# Patient Record
Sex: FEMALE | Race: WHITE | NOT HISPANIC OR LATINO
[De-identification: names, ages, dates, MRNs, and addresses within clinical notes are randomized per-mention and may not be internally consistent; named-entity substitution may affect disease eponyms.]

---

## 2018-10-10 ENCOUNTER — APPOINTMENT (OUTPATIENT)
Dept: BARIATRICS | Facility: CLINIC | Age: 33
End: 2018-10-10
Payer: COMMERCIAL

## 2018-10-10 VITALS
OXYGEN SATURATION: 100 % | HEIGHT: 64 IN | TEMPERATURE: 97.9 F | BODY MASS INDEX: 40.29 KG/M2 | DIASTOLIC BLOOD PRESSURE: 77 MMHG | SYSTOLIC BLOOD PRESSURE: 127 MMHG | HEART RATE: 78 BPM | WEIGHT: 236 LBS

## 2018-10-10 DIAGNOSIS — N39.3 STRESS INCONTINENCE (FEMALE) (MALE): ICD-10-CM

## 2018-10-10 DIAGNOSIS — K21.9 GASTRO-ESOPHAGEAL REFLUX DISEASE W/OUT ESOPHAGITIS: ICD-10-CM

## 2018-10-10 PROCEDURE — 99203 OFFICE O/P NEW LOW 30 MIN: CPT

## 2018-10-10 PROCEDURE — 97802 MEDICAL NUTRITION INDIV IN: CPT

## 2018-11-14 ENCOUNTER — APPOINTMENT (OUTPATIENT)
Dept: BARIATRICS | Facility: CLINIC | Age: 33
End: 2018-11-14
Payer: COMMERCIAL

## 2018-11-14 ENCOUNTER — TRANSCRIPTION ENCOUNTER (OUTPATIENT)
Age: 33
End: 2018-11-14

## 2018-11-14 VITALS
BODY MASS INDEX: 40.8 KG/M2 | HEIGHT: 64 IN | WEIGHT: 239 LBS | DIASTOLIC BLOOD PRESSURE: 73 MMHG | OXYGEN SATURATION: 98 % | TEMPERATURE: 98.1 F | SYSTOLIC BLOOD PRESSURE: 111 MMHG | HEART RATE: 73 BPM

## 2018-11-14 DIAGNOSIS — R06.02 SHORTNESS OF BREATH: ICD-10-CM

## 2018-11-14 DIAGNOSIS — Z83.49 FAMILY HISTORY OF OTHER ENDOCRINE, NUTRITIONAL AND METABOLIC DISEASES: ICD-10-CM

## 2018-11-14 DIAGNOSIS — Z86.39 PERSONAL HISTORY OF OTHER ENDOCRINE, NUTRITIONAL AND METABOLIC DISEASE: ICD-10-CM

## 2018-11-14 DIAGNOSIS — R12 HEARTBURN: ICD-10-CM

## 2018-11-14 DIAGNOSIS — E66.01 MORBID (SEVERE) OBESITY DUE TO EXCESS CALORIES: ICD-10-CM

## 2018-11-14 DIAGNOSIS — R32 UNSPECIFIED URINARY INCONTINENCE: ICD-10-CM

## 2018-11-14 DIAGNOSIS — M25.579 PAIN IN UNSPECIFIED ANKLE AND JOINTS OF UNSPECIFIED FOOT: ICD-10-CM

## 2018-11-14 DIAGNOSIS — Z85.850 PERSONAL HISTORY OF MALIGNANT NEOPLASM OF THYROID: ICD-10-CM

## 2018-11-14 DIAGNOSIS — Z83.3 FAMILY HISTORY OF DIABETES MELLITUS: ICD-10-CM

## 2018-11-14 DIAGNOSIS — Z78.9 OTHER SPECIFIED HEALTH STATUS: ICD-10-CM

## 2018-11-14 PROCEDURE — 99213 OFFICE O/P EST LOW 20 MIN: CPT

## 2018-11-14 RX ORDER — LEVOTHYROXINE SODIUM 137 UG/1
137 TABLET ORAL
Refills: 0 | Status: ACTIVE | COMMUNITY

## 2018-11-26 VITALS
WEIGHT: 233.47 LBS | HEART RATE: 84 BPM | RESPIRATION RATE: 18 BRPM | OXYGEN SATURATION: 98 % | TEMPERATURE: 98 F | DIASTOLIC BLOOD PRESSURE: 76 MMHG | SYSTOLIC BLOOD PRESSURE: 113 MMHG | HEIGHT: 64 IN

## 2018-11-27 ENCOUNTER — RESULT REVIEW (OUTPATIENT)
Age: 33
End: 2018-11-27

## 2018-11-27 ENCOUNTER — INPATIENT (INPATIENT)
Facility: HOSPITAL | Age: 33
LOS: 2 days | Discharge: HOME | DRG: 621 | End: 2018-11-30
Attending: SURGERY | Admitting: SURGERY
Payer: COMMERCIAL

## 2018-11-27 ENCOUNTER — APPOINTMENT (OUTPATIENT)
Dept: BARIATRICS | Facility: HOSPITAL | Age: 33
End: 2018-11-27

## 2018-11-27 DIAGNOSIS — Z98.890 OTHER SPECIFIED POSTPROCEDURAL STATES: Chronic | ICD-10-CM

## 2018-11-27 DIAGNOSIS — E89.0 POSTPROCEDURAL HYPOTHYROIDISM: Chronic | ICD-10-CM

## 2018-11-27 LAB
APPEARANCE UR: ABNORMAL
BACTERIA # UR AUTO: PRESENT /HPF
BILIRUB UR-MCNC: NEGATIVE — SIGNIFICANT CHANGE UP
COLOR SPEC: YELLOW — SIGNIFICANT CHANGE UP
DIFF PNL FLD: NEGATIVE — SIGNIFICANT CHANGE UP
EPI CELLS # UR: ABNORMAL /HPF (ref 0–5)
GLUCOSE UR QL: NEGATIVE — SIGNIFICANT CHANGE UP
HCT VFR BLD CALC: 36.3 % — SIGNIFICANT CHANGE UP (ref 34.5–45)
HGB BLD-MCNC: 12.3 G/DL — SIGNIFICANT CHANGE UP (ref 11.5–15.5)
KETONES UR-MCNC: NEGATIVE — SIGNIFICANT CHANGE UP
LEUKOCYTE ESTERASE UR-ACNC: ABNORMAL
MCHC RBC-ENTMCNC: 29.6 PG — SIGNIFICANT CHANGE UP (ref 27–34)
MCHC RBC-ENTMCNC: 33.9 G/DL — SIGNIFICANT CHANGE UP (ref 32–36)
MCV RBC AUTO: 87.5 FL — SIGNIFICANT CHANGE UP (ref 80–100)
NITRITE UR-MCNC: NEGATIVE — SIGNIFICANT CHANGE UP
PH UR: 6 — SIGNIFICANT CHANGE UP (ref 5–8)
PLATELET # BLD AUTO: 262 K/UL — SIGNIFICANT CHANGE UP (ref 150–400)
PROT UR-MCNC: NEGATIVE MG/DL — SIGNIFICANT CHANGE UP
RBC # BLD: 4.15 M/UL — SIGNIFICANT CHANGE UP (ref 3.8–5.2)
RBC # FLD: 12.8 % — SIGNIFICANT CHANGE UP (ref 10.3–16.9)
RBC CASTS # UR COMP ASSIST: < 5 /HPF — SIGNIFICANT CHANGE UP
SP GR SPEC: 1.02 — SIGNIFICANT CHANGE UP (ref 1–1.03)
UROBILINOGEN FLD QL: 0.2 E.U./DL — SIGNIFICANT CHANGE UP
WBC # BLD: 18 K/UL — HIGH (ref 3.8–10.5)
WBC # FLD AUTO: 18 K/UL — HIGH (ref 3.8–10.5)
WBC UR QL: > 10 /HPF

## 2018-11-27 PROCEDURE — 43775 LAP SLEEVE GASTRECTOMY: CPT | Mod: GC

## 2018-11-27 RX ORDER — ONDANSETRON 8 MG/1
4 TABLET, FILM COATED ORAL EVERY 6 HOURS
Qty: 0 | Refills: 0 | Status: DISCONTINUED | OUTPATIENT
Start: 2018-11-27 | End: 2018-11-30

## 2018-11-27 RX ORDER — HYDROMORPHONE HYDROCHLORIDE 2 MG/ML
0.5 INJECTION INTRAMUSCULAR; INTRAVENOUS; SUBCUTANEOUS
Qty: 0 | Refills: 0 | Status: DISCONTINUED | OUTPATIENT
Start: 2018-11-27 | End: 2018-11-28

## 2018-11-27 RX ORDER — HYDROMORPHONE HYDROCHLORIDE 2 MG/ML
1 INJECTION INTRAMUSCULAR; INTRAVENOUS; SUBCUTANEOUS EVERY 4 HOURS
Qty: 0 | Refills: 0 | Status: DISCONTINUED | OUTPATIENT
Start: 2018-11-27 | End: 2018-11-28

## 2018-11-27 RX ORDER — SODIUM CHLORIDE 9 MG/ML
1000 INJECTION, SOLUTION INTRAVENOUS
Qty: 0 | Refills: 0 | Status: DISCONTINUED | OUTPATIENT
Start: 2018-11-27 | End: 2018-11-28

## 2018-11-27 RX ORDER — ENOXAPARIN SODIUM 100 MG/ML
40 INJECTION SUBCUTANEOUS ONCE
Qty: 0 | Refills: 0 | Status: COMPLETED | OUTPATIENT
Start: 2018-11-27 | End: 2018-11-27

## 2018-11-27 RX ORDER — LEVOTHYROXINE SODIUM 125 MCG
137 TABLET ORAL DAILY
Qty: 0 | Refills: 0 | Status: DISCONTINUED | OUTPATIENT
Start: 2018-11-27 | End: 2018-11-30

## 2018-11-27 RX ORDER — HEPARIN SODIUM 5000 [USP'U]/ML
7500 INJECTION INTRAVENOUS; SUBCUTANEOUS EVERY 8 HOURS
Qty: 0 | Refills: 0 | Status: DISCONTINUED | OUTPATIENT
Start: 2018-11-27 | End: 2018-11-30

## 2018-11-27 RX ORDER — HYDROMORPHONE HYDROCHLORIDE 2 MG/ML
0.5 INJECTION INTRAMUSCULAR; INTRAVENOUS; SUBCUTANEOUS EVERY 4 HOURS
Qty: 0 | Refills: 0 | Status: DISCONTINUED | OUTPATIENT
Start: 2018-11-27 | End: 2018-11-28

## 2018-11-27 RX ORDER — PANTOPRAZOLE SODIUM 20 MG/1
40 TABLET, DELAYED RELEASE ORAL DAILY
Qty: 0 | Refills: 0 | Status: DISCONTINUED | OUTPATIENT
Start: 2018-11-27 | End: 2018-11-30

## 2018-11-27 RX ORDER — BUPIVACAINE 13.3 MG/ML
20 INJECTION, SUSPENSION, LIPOSOMAL INFILTRATION ONCE
Qty: 0 | Refills: 0 | Status: DISCONTINUED | OUTPATIENT
Start: 2018-11-27 | End: 2018-11-30

## 2018-11-27 RX ORDER — SCOPALAMINE 1 MG/3D
1 PATCH, EXTENDED RELEASE TRANSDERMAL ONCE
Qty: 0 | Refills: 0 | Status: COMPLETED | OUTPATIENT
Start: 2018-11-27 | End: 2018-11-27

## 2018-11-27 RX ADMIN — HYDROMORPHONE HYDROCHLORIDE 1 MILLIGRAM(S): 2 INJECTION INTRAMUSCULAR; INTRAVENOUS; SUBCUTANEOUS at 22:09

## 2018-11-27 RX ADMIN — HYDROMORPHONE HYDROCHLORIDE 1 MILLIGRAM(S): 2 INJECTION INTRAMUSCULAR; INTRAVENOUS; SUBCUTANEOUS at 22:20

## 2018-11-27 RX ADMIN — SCOPALAMINE 1 PATCH: 1 PATCH, EXTENDED RELEASE TRANSDERMAL at 19:05

## 2018-11-27 RX ADMIN — ENOXAPARIN SODIUM 40 MILLIGRAM(S): 100 INJECTION SUBCUTANEOUS at 11:11

## 2018-11-27 RX ADMIN — ONDANSETRON 4 MILLIGRAM(S): 8 TABLET, FILM COATED ORAL at 22:04

## 2018-11-27 RX ADMIN — SCOPALAMINE 1 PATCH: 1 PATCH, EXTENDED RELEASE TRANSDERMAL at 11:11

## 2018-11-27 RX ADMIN — HEPARIN SODIUM 7500 UNIT(S): 5000 INJECTION INTRAVENOUS; SUBCUTANEOUS at 21:48

## 2018-11-27 RX ADMIN — HYDROMORPHONE HYDROCHLORIDE 0.5 MILLIGRAM(S): 2 INJECTION INTRAMUSCULAR; INTRAVENOUS; SUBCUTANEOUS at 18:45

## 2018-11-27 NOTE — H&P ADULT - NSHPPHYSICALEXAM_GEN_ALL_CORE
NEURO: A&Ox3, NAD, SORTO.  CV: RRR, S1&S2.   PULM: CTAB, no increased WOB.  ABD: Obese, Soft, ND, NT.   EXTR: WWP. No peripheral edema.

## 2018-11-27 NOTE — BRIEF OPERATIVE NOTE - PROCEDURE
<<-----Click on this checkbox to enter Procedure Robot-assisted laparoscopic sleeve gastrectomy  11/27/2018    Active  BBORDEN

## 2018-11-27 NOTE — H&P ADULT - HISTORY OF PRESENT ILLNESS
33F w/ h/o thyroid ca s/p total thyroidectomy 2009, GERD, stress urinary incontinence, joint pain & MO (BMI 40) who presents for robotic LSG.

## 2018-11-27 NOTE — PROGRESS NOTE ADULT - SUBJECTIVE AND OBJECTIVE BOX
POST-OPERATIVE NOTE    Procedure: Robot-assisted laparoscopic sleeve gastrectomy     Diagnosis/Indication:    Surgeon: morbid obesity    S: Pt has no complaints. Denies CP, SOB, SAAVEDRA, calf tenderness. Pain controlled with medication. Denies nausea, vomiting.    O:  T(C): --  T(F): --  HR: 62 (11-27-18 @ 19:00) (60 - 64)  BP: 136/58 (11-27-18 @ 19:00) (132/60 - 139/66)  RR: 25 (11-27-18 @ 19:00) (6 - 25)  SpO2: 100% (11-27-18 @ 19:00) (100% - 100%)  Wt(kg): --    Gen: NAD, resting comfortably in bed  C/V: NSR  Pulm: Nonlabored breathing, no respiratory distress  Abd: soft, NT/ND, incision areas are clean, dry and intact  Extrem: WWP, no calf edema or tenderness, SCDs in place    A/P: 33y Female s/p above procedure  Diet: BCLD  IVF: LR @ 100  Pain/nausea control  SQH/SCDs/OOBA/IS  Dispo pending pain control, PO tolerance, clinical improvement POST-OPERATIVE NOTE    Procedure: Robot-assisted laparoscopic sleeve gastrectomy     Diagnosis/Indication: morbid obesity    Surgeon: Dr. Claire    S: Pt has no complaints. Denies CP, SOB, SAAVEDRA, calf tenderness. Pain controlled with medication. Denies nausea, vomiting.    O:  T(C): --  T(F): --  HR: 62 (11-27-18 @ 19:00) (60 - 64)  BP: 136/58 (11-27-18 @ 19:00) (132/60 - 139/66)  RR: 25 (11-27-18 @ 19:00) (6 - 25)  SpO2: 100% (11-27-18 @ 19:00) (100% - 100%)  Wt(kg): --    Gen: NAD, resting comfortably in bed  C/V: NSR  Pulm: Nonlabored breathing, no respiratory distress  Abd: soft, NT/ND, incision areas are clean, dry and intact  Extrem: WWP, no calf edema or tenderness, SCDs in place    A/P: 33y Female s/p above procedure  Diet: BCLD  IVF: LR @ 100  Pain/nausea control  SQH/SCDs/OOBA/IS  Dispo pending pain control, PO tolerance, clinical improvement

## 2018-11-27 NOTE — BRIEF OPERATIVE NOTE - OPERATION/FINDINGS
Stomach divided along Fr Bougie edge using SureForm robotic stapler. Omentum sutured to greater curvature of sleeved stomach. Hemostasis achieved. Fascia at 12mm post site closed using Vicryl suture. Port sites closed w/ 4-0 Monocryl sutures & surgical glue.

## 2018-11-27 NOTE — H&P ADULT - ASSESSMENT
33F w/ h/o thyroid ca s/p total thyroidectomy 2009, GERD, stress urinary incontinence, joint pain & MO (BMI 40) who presents for robotic LSG.     -CBC 6hrs post-op  -Pain/nausea control  -BCLD; IVF until tolerating adequate PO  -Protonix  -SCDs, OOB/A, IS  -SQH pending post-op CBC  -AM labs    -Nutrition consult in AM

## 2018-11-28 ENCOUNTER — TRANSCRIPTION ENCOUNTER (OUTPATIENT)
Age: 33
End: 2018-11-28

## 2018-11-28 LAB
ANION GAP SERPL CALC-SCNC: 13 MMOL/L — SIGNIFICANT CHANGE UP (ref 5–17)
BUN SERPL-MCNC: 10 MG/DL — SIGNIFICANT CHANGE UP (ref 7–23)
CALCIUM SERPL-MCNC: 9 MG/DL — SIGNIFICANT CHANGE UP (ref 8.4–10.5)
CHLORIDE SERPL-SCNC: 99 MMOL/L — SIGNIFICANT CHANGE UP (ref 96–108)
CO2 SERPL-SCNC: 21 MMOL/L — LOW (ref 22–31)
CREAT SERPL-MCNC: 0.53 MG/DL — SIGNIFICANT CHANGE UP (ref 0.5–1.3)
GLUCOSE SERPL-MCNC: 142 MG/DL — HIGH (ref 70–99)
HCT VFR BLD CALC: 36.1 % — SIGNIFICANT CHANGE UP (ref 34.5–45)
HGB BLD-MCNC: 12.2 G/DL — SIGNIFICANT CHANGE UP (ref 11.5–15.5)
MAGNESIUM SERPL-MCNC: 1.9 MG/DL — SIGNIFICANT CHANGE UP (ref 1.6–2.6)
MCHC RBC-ENTMCNC: 29.6 PG — SIGNIFICANT CHANGE UP (ref 27–34)
MCHC RBC-ENTMCNC: 33.8 G/DL — SIGNIFICANT CHANGE UP (ref 32–36)
MCV RBC AUTO: 87.6 FL — SIGNIFICANT CHANGE UP (ref 80–100)
PHOSPHATE SERPL-MCNC: 3 MG/DL — SIGNIFICANT CHANGE UP (ref 2.5–4.5)
PLATELET # BLD AUTO: 281 K/UL — SIGNIFICANT CHANGE UP (ref 150–400)
POTASSIUM SERPL-MCNC: 3.8 MMOL/L — SIGNIFICANT CHANGE UP (ref 3.5–5.3)
POTASSIUM SERPL-SCNC: 3.8 MMOL/L — SIGNIFICANT CHANGE UP (ref 3.5–5.3)
RBC # BLD: 4.12 M/UL — SIGNIFICANT CHANGE UP (ref 3.8–5.2)
RBC # FLD: 12.7 % — SIGNIFICANT CHANGE UP (ref 10.3–16.9)
SODIUM SERPL-SCNC: 133 MMOL/L — LOW (ref 135–145)
WBC # BLD: 13.1 K/UL — HIGH (ref 3.8–10.5)
WBC # FLD AUTO: 13.1 K/UL — HIGH (ref 3.8–10.5)

## 2018-11-28 RX ORDER — METOCLOPRAMIDE HCL 10 MG
10 TABLET ORAL ONCE
Qty: 0 | Refills: 0 | Status: COMPLETED | OUTPATIENT
Start: 2018-11-28 | End: 2018-11-28

## 2018-11-28 RX ORDER — OXYCODONE AND ACETAMINOPHEN 5; 325 MG/1; MG/1
2 TABLET ORAL EVERY 4 HOURS
Qty: 0 | Refills: 0 | Status: DISCONTINUED | OUTPATIENT
Start: 2018-11-28 | End: 2018-11-30

## 2018-11-28 RX ORDER — DIPHENHYDRAMINE HCL 50 MG
25 CAPSULE ORAL ONCE
Qty: 0 | Refills: 0 | Status: COMPLETED | OUTPATIENT
Start: 2018-11-28 | End: 2018-11-28

## 2018-11-28 RX ORDER — DEXAMETHASONE 0.5 MG/5ML
6 ELIXIR ORAL ONCE
Qty: 0 | Refills: 0 | Status: COMPLETED | OUTPATIENT
Start: 2018-11-28 | End: 2018-11-28

## 2018-11-28 RX ORDER — SODIUM CHLORIDE 9 MG/ML
1000 INJECTION, SOLUTION INTRAVENOUS
Qty: 0 | Refills: 0 | Status: DISCONTINUED | OUTPATIENT
Start: 2018-11-28 | End: 2018-11-30

## 2018-11-28 RX ORDER — OXYCODONE AND ACETAMINOPHEN 5; 325 MG/1; MG/1
1 TABLET ORAL EVERY 4 HOURS
Qty: 0 | Refills: 0 | Status: DISCONTINUED | OUTPATIENT
Start: 2018-11-28 | End: 2018-11-30

## 2018-11-28 RX ADMIN — ONDANSETRON 4 MILLIGRAM(S): 8 TABLET, FILM COATED ORAL at 22:20

## 2018-11-28 RX ADMIN — Medication 10 MILLIGRAM(S): at 01:09

## 2018-11-28 RX ADMIN — HEPARIN SODIUM 7500 UNIT(S): 5000 INJECTION INTRAVENOUS; SUBCUTANEOUS at 05:44

## 2018-11-28 RX ADMIN — Medication 10 MILLIGRAM(S): at 09:56

## 2018-11-28 RX ADMIN — ONDANSETRON 4 MILLIGRAM(S): 8 TABLET, FILM COATED ORAL at 04:37

## 2018-11-28 RX ADMIN — HEPARIN SODIUM 7500 UNIT(S): 5000 INJECTION INTRAVENOUS; SUBCUTANEOUS at 13:25

## 2018-11-28 RX ADMIN — Medication 6 MILLIGRAM(S): at 15:53

## 2018-11-28 RX ADMIN — SCOPALAMINE 1 PATCH: 1 PATCH, EXTENDED RELEASE TRANSDERMAL at 20:07

## 2018-11-28 RX ADMIN — Medication 137 MICROGRAM(S): at 05:44

## 2018-11-28 RX ADMIN — HEPARIN SODIUM 7500 UNIT(S): 5000 INJECTION INTRAVENOUS; SUBCUTANEOUS at 22:21

## 2018-11-28 RX ADMIN — SODIUM CHLORIDE 100 MILLILITER(S): 9 INJECTION, SOLUTION INTRAVENOUS at 11:22

## 2018-11-28 RX ADMIN — Medication 25 MILLIGRAM(S): at 03:14

## 2018-11-28 RX ADMIN — PANTOPRAZOLE SODIUM 40 MILLIGRAM(S): 20 TABLET, DELAYED RELEASE ORAL at 13:15

## 2018-11-28 RX ADMIN — SODIUM CHLORIDE 100 MILLILITER(S): 9 INJECTION, SOLUTION INTRAVENOUS at 20:59

## 2018-11-28 RX ADMIN — SCOPALAMINE 1 PATCH: 1 PATCH, EXTENDED RELEASE TRANSDERMAL at 07:08

## 2018-11-28 RX ADMIN — SODIUM CHLORIDE 100 MILLILITER(S): 9 INJECTION, SOLUTION INTRAVENOUS at 04:37

## 2018-11-28 RX ADMIN — ONDANSETRON 4 MILLIGRAM(S): 8 TABLET, FILM COATED ORAL at 11:28

## 2018-11-28 NOTE — DISCHARGE NOTE ADULT - CARE PLAN
Principal Discharge DX:	Morbid obesity with BMI of 40.0-44.9, adult  Goal:	Recovery  Assessment and plan of treatment:	Follow up with  in 1 week. Call the office at  to schedule your appointment.  You may shower; soap and water over incision sites. Do not scrub. Pat dry when done. No tub bathing or swimming until cleared. Keep incision sites out of the sun as scars will darken. No heavy lifting (>10lbs) or strenuous exercise. Diet: Bariatric Full Fluids. 60 grams protein daily.  64 fluid ounces water daily. Drink small sips throughout the day. Continue diet as outlined by paperwork received as a pre-operative patient. You should be urinating at least 3-4x per day. Call the office if you experience increasing abdominal pain, nausea, vomiting, or temperature >100.4F.  NO ASPIRIN OR NSAIDs until approved by Dr. Claire. Avoid alcoholic beverages until cleared by Dr. Claire.

## 2018-11-28 NOTE — DIETITIAN INITIAL EVALUATION ADULT. - NUTRITIONGOAL OUTCOME1
Pt to express verbal comprehension of edu by stating 2 points (fluid and protein, vit/mineral needs)

## 2018-11-28 NOTE — DISCHARGE NOTE ADULT - ADDITIONAL INSTRUCTIONS
1) Please take Percocet 1 to 2 tabs by mouth every 4 to 6 hours as needed for pain control.  2) Please take Omeprazole 40 mg once a day by mouth.

## 2018-11-28 NOTE — PROGRESS NOTE ADULT - ASSESSMENT
33F w/ h/o thyroid ca s/p total thyroidectomy 2009, GERD, stress urinary incontinence, joint pain & MO (BMI 40) who presents for robotic LSG. Preop H/H 13.0/39.2.       -Pain/nausea control  -BCLD; IVF until tolerating adequate PO  -Protonix  -SCDs, OOB/A, IS  -AM labs    -Nutrition consult in AM

## 2018-11-28 NOTE — DIETITIAN INITIAL EVALUATION ADULT. - OTHER INFO
34yo F s/p RA lap sleeve gastrectomy POD1. Currently on BCLD diet and not tolerating well, pt reports experiencing nausea, spitting up and incisional pain. Pt reports she has protein supplements for when diet advanced that fit bariatric requirements (>20 g protein), and has vitamin D/Calcium supplements but unable to recall other vitamins she has. Discussed vitamins that will be needed and provided written list. Pt given edu on diet advancement and needs s/p LSG. Pt expressed comprehension of edu given. NKFA, Kosher diet. Skin: surgical incision; GI: abdominal pain and faint bowel sounds per flow sheet 34yo F s/p RA lap sleeve gastrectomy POD1. Currently on BARICLLQ diet tolating PO. MInimal intake 2/2 nausea, reported having some sips of water this am and nothing else from tray. Pt continuously spitting up during assessment. Pt reports having appropriate protein supplements and is prepared w/ most vitamins (chewable CalCitrate +D and MVI). RD provided indepth edu on diet advancement and specific nutrient needs s/p LSG. Pt expressed comprehension of edu given. NKFA or dietary restrictions. Skin: surgical incision; GI: abdominal pain and faint bowel sounds per flow sheet

## 2018-11-28 NOTE — DISCHARGE NOTE ADULT - CARE PROVIDER_API CALL
Dilip Claire (MD), Surgery  [address]  [address]  [place], [place] [address]  Phone: [phone]  Fax: [phone]

## 2018-11-28 NOTE — DIETITIAN INITIAL EVALUATION ADULT. - NS AS NUTRI INTERV ED CONTENT
Pt given education on bariatric diet progression, hydration, permitted foods and compliance with vitamin supplement regimen. Pt expressed comprehension of edu given, expected compliance is good when nausea subsides. Pt given education on bariatric diet progression, hydration, permitted foods and compliance with vitamin supplement regimen. Pt expressed comprehension of edu given, compliance expected.

## 2018-11-28 NOTE — DISCHARGE NOTE ADULT - PATIENT PORTAL LINK FT
You can access the [facility] Patient Portal, offered by [facility], by registering with the following website: [email or web address]

## 2018-11-28 NOTE — DISCHARGE NOTE ADULT - MEDICATION SUMMARY - MEDICATIONS TO TAKE
I will START or STAY ON the medications listed below when I get home from the hospital:    Percocet 5/325 oral tablet  -- 2 tab(s) by mouth every 6 hours x 2 days, As Needed  -for severe pain MDD:8   -- Caution federal law prohibits the transfer of this drug to any person other  than the person for whom it was prescribed.  May cause drowsiness.  Alcohol may intensify this effect.  Use care when operating dangerous machinery.  This prescription cannot be refilled.  This product contains acetaminophen.  Do not use  with any other product containing acetaminophen to prevent possible liver damage.  Using more of this medication than prescribed may cause serious breathing problems.    -- Indication: For pain    omeprazole 40 mg oral delayed release capsule  -- 1 cap(s) by mouth once a day   -- It is very important that you take or use this exactly as directed.  Do not skip doses or discontinue unless directed by your doctor.  Obtain medical advice before taking any non-prescription drugs as some may affect the action of this medication.  Swallow whole.  Do not crush.    -- Indication: For antacid    Synthroid 137 mcg (0.137 mg) oral tablet  -- 1 tab(s) by mouth once a day  -- Indication: For Home med    multivitamin  -- orally once a day  -- Indication: For supplement    Vitamin B12 500 mcg oral tablet  -- 1 tab(s) by mouth 2 times a day  -- Indication: For supplement    folic acid 0.4 mg oral tablet  -- orally 2 times a day  -- Indication: For supplement    Vitamin C 1000 mg oral tablet  -- 1 tab(s) by mouth once a day  -- Indication: For supplement    Vitamin D3 5000 intl units oral capsule  -- 1 cap(s) by mouth once a day  -- Indication: For supplement

## 2018-11-28 NOTE — PROGRESS NOTE ADULT - SUBJECTIVE AND OBJECTIVE BOX
OVERNIGHT EVENTS: POC wnl, pain controlled, c/o nausea (spit up) reglan given, post op h/h: 12.3/36.3, + TOV 400cc    STATUS POST:  Robotic assisted lap sleeve gastrectomy     POST OPERATIVE DAY #: 1    SUBJECTIVE: Patient examined bedside with chief resident. Patient complaining of incisional pain and nausea. Patient denies chest pain, SOB and vomiting.  Patient making adequate urine.        heparin  Injectable 7500 Unit(s) SubCutaneous every 8 hours      Vital Signs Last 24 Hrs  T(C): 37.3 (2018 09:20), Max: 37.3 (2018 09:20)  T(F): 99.2 (2018 09:20), Max: 99.2 (2018 09:20)  HR: 66 (2018 09:20) (60 - 75)  BP: 116/73 (2018 09:20) (107/71 - 139/66)  BP(mean): 90 (2018 19:00) (87 - 95)  RR: 18 (2018 09:20) (6 - 18)  SpO2: 95% (2018 09:20) (95% - 100%)  I&O's Detail    2018 07:01  -  2018 07:00  --------------------------------------------------------  IN:    lactated ringers.: 1200 mL  Total IN: 1200 mL    OUT:    Voided: 850 mL  Total OUT: 850 mL    Total NET: 350 mL      2018 07:01  -  2018 10:08  --------------------------------------------------------  IN:  Total IN: 0 mL    OUT:    Voided: 200 mL  Total OUT: 200 mL    Total NET: -200 mL          General: NAD, resting comfortably in bed  C/V: NSR  Pulm: Nonlabored breathing, no respiratory distress  Abd:  Soft, non- distended, TTP around incision site, incision clean dry and intact   Extrem: WWP, no edema, SCDs in place      LABS:                        12.2   13.1  )-----------( 281      ( 2018 06:00 )             36.1         133<L>  |  99  |  10  ----------------------------<  142<H>  3.8   |  21<L>  |  0.53    Ca    9.0      2018 05:58  Phos  3.0       Mg     1.9             Urinalysis Basic - ( 2018 11:10 )    Color: Yellow / Appearance: SL Cloudy / S.020 / pH: x  Gluc: x / Ketone: NEGATIVE  / Bili: Negative / Urobili: 0.2 E.U./dL   Blood: x / Protein: NEGATIVE mg/dL / Nitrite: NEGATIVE   Leuk Esterase: Moderate / RBC: < 5 /HPF / WBC > 10 /HPF   Sq Epi: x / Non Sq Epi: Moderate /HPF / Bacteria: Present /HPF        RADIOLOGY & ADDITIONAL STUDIES:

## 2018-11-28 NOTE — DISCHARGE NOTE ADULT - PLAN OF CARE
Recovery Follow up with  in 1 week. Call the office at  to schedule your appointment.  You may shower; soap and water over incision sites. Do not scrub. Pat dry when done. No tub bathing or swimming until cleared. Keep incision sites out of the sun as scars will darken. No heavy lifting (>10lbs) or strenuous exercise. Diet: Bariatric Full Fluids. 60 grams protein daily.  64 fluid ounces water daily. Drink small sips throughout the day. Continue diet as outlined by paperwork received as a pre-operative patient. You should be urinating at least 3-4x per day. Call the office if you experience increasing abdominal pain, nausea, vomiting, or temperature >100.4F.  NO ASPIRIN OR NSAIDs until approved by Dr. Claire. Avoid alcoholic beverages until cleared by Dr. Claire.

## 2018-11-28 NOTE — DISCHARGE NOTE ADULT - HOSPITAL COURSE
33F w/ h/o thyroid ca s/p total thyroidectomy 2009, GERD, stress urinary incontinence, joint pain & MO (BMI 40) who presents for robotic LSG. Patient s/p robotic assisted sleeve gastrectomy. Pt tolerated the procedure well. At time of discharge, pt was tolerating a bariatric clear liquid diet, and pt's pain was controlled. Plan is to follow up with   in the office.

## 2018-11-28 NOTE — DIETITIAN INITIAL EVALUATION ADULT. - ENERGY NEEDS
Height 5'4", Weight 233 lb,  lb +/-10%, %%, BMI 40.1  IBW used for calculations as pt >120% of IBW  Above energy needs calculated for wt maintenance (20-25 kcal/kg)  Weeks 1-2 estimated needs: 545-653kcal/day (10-12 kcal/kg),  81-114g pro/day (1.5-2.1 g/kg), >/=64 oz clear fluids Height 5'4", Weight 233 lb,  lb +/-10%, %%, BMI 40.1  IBW used for calculations as pt >120% of IBW  Above energy needs calculated for wt maintenance (20-25 kcal/kg)  Weeks 1-2 estimated needs: 544-653kcal/day (10-12kcal/kg), 82-114g pro/day (1.5-2.1g/kg), >/=64oz clear fluids.

## 2018-11-29 LAB
ANION GAP SERPL CALC-SCNC: 10 MMOL/L — SIGNIFICANT CHANGE UP (ref 5–17)
BUN SERPL-MCNC: 10 MG/DL — SIGNIFICANT CHANGE UP (ref 7–23)
CALCIUM SERPL-MCNC: 8.8 MG/DL — SIGNIFICANT CHANGE UP (ref 8.4–10.5)
CHLORIDE SERPL-SCNC: 102 MMOL/L — SIGNIFICANT CHANGE UP (ref 96–108)
CO2 SERPL-SCNC: 26 MMOL/L — SIGNIFICANT CHANGE UP (ref 22–31)
CREAT SERPL-MCNC: 0.61 MG/DL — SIGNIFICANT CHANGE UP (ref 0.5–1.3)
GLUCOSE SERPL-MCNC: 151 MG/DL — HIGH (ref 70–99)
HCT VFR BLD CALC: 35.1 % — SIGNIFICANT CHANGE UP (ref 34.5–45)
HGB BLD-MCNC: 11.6 G/DL — SIGNIFICANT CHANGE UP (ref 11.5–15.5)
MAGNESIUM SERPL-MCNC: 2.2 MG/DL — SIGNIFICANT CHANGE UP (ref 1.6–2.6)
MCHC RBC-ENTMCNC: 29.1 PG — SIGNIFICANT CHANGE UP (ref 27–34)
MCHC RBC-ENTMCNC: 33 G/DL — SIGNIFICANT CHANGE UP (ref 32–36)
MCV RBC AUTO: 88.2 FL — SIGNIFICANT CHANGE UP (ref 80–100)
PHOSPHATE SERPL-MCNC: 1.6 MG/DL — LOW (ref 2.5–4.5)
PLATELET # BLD AUTO: 292 K/UL — SIGNIFICANT CHANGE UP (ref 150–400)
POTASSIUM SERPL-MCNC: 3.7 MMOL/L — SIGNIFICANT CHANGE UP (ref 3.5–5.3)
POTASSIUM SERPL-SCNC: 3.7 MMOL/L — SIGNIFICANT CHANGE UP (ref 3.5–5.3)
RBC # BLD: 3.98 M/UL — SIGNIFICANT CHANGE UP (ref 3.8–5.2)
RBC # FLD: 13.1 % — SIGNIFICANT CHANGE UP (ref 10.3–16.9)
SODIUM SERPL-SCNC: 138 MMOL/L — SIGNIFICANT CHANGE UP (ref 135–145)
WBC # BLD: 15.8 K/UL — HIGH (ref 3.8–10.5)
WBC # FLD AUTO: 15.8 K/UL — HIGH (ref 3.8–10.5)

## 2018-11-29 RX ORDER — OMEPRAZOLE 10 MG/1
1 CAPSULE, DELAYED RELEASE ORAL
Qty: 30 | Refills: 0 | OUTPATIENT
Start: 2018-11-29 | End: 2018-12-28

## 2018-11-29 RX ORDER — HYDROMORPHONE HYDROCHLORIDE 2 MG/ML
0.5 INJECTION INTRAMUSCULAR; INTRAVENOUS; SUBCUTANEOUS ONCE
Qty: 0 | Refills: 0 | Status: DISCONTINUED | OUTPATIENT
Start: 2018-11-29 | End: 2018-11-29

## 2018-11-29 RX ORDER — POTASSIUM PHOSPHATE, MONOBASIC POTASSIUM PHOSPHATE, DIBASIC 236; 224 MG/ML; MG/ML
30 INJECTION, SOLUTION INTRAVENOUS ONCE
Qty: 0 | Refills: 0 | Status: COMPLETED | OUTPATIENT
Start: 2018-11-29 | End: 2018-11-29

## 2018-11-29 RX ADMIN — Medication 137 MICROGRAM(S): at 06:46

## 2018-11-29 RX ADMIN — POTASSIUM PHOSPHATE, MONOBASIC POTASSIUM PHOSPHATE, DIBASIC 85 MILLIMOLE(S): 236; 224 INJECTION, SOLUTION INTRAVENOUS at 14:06

## 2018-11-29 RX ADMIN — SCOPALAMINE 1 PATCH: 1 PATCH, EXTENDED RELEASE TRANSDERMAL at 06:52

## 2018-11-29 RX ADMIN — HEPARIN SODIUM 7500 UNIT(S): 5000 INJECTION INTRAVENOUS; SUBCUTANEOUS at 14:03

## 2018-11-29 RX ADMIN — HEPARIN SODIUM 7500 UNIT(S): 5000 INJECTION INTRAVENOUS; SUBCUTANEOUS at 22:25

## 2018-11-29 RX ADMIN — HEPARIN SODIUM 7500 UNIT(S): 5000 INJECTION INTRAVENOUS; SUBCUTANEOUS at 06:46

## 2018-11-29 RX ADMIN — SODIUM CHLORIDE 100 MILLILITER(S): 9 INJECTION, SOLUTION INTRAVENOUS at 06:48

## 2018-11-29 RX ADMIN — SCOPALAMINE 1 PATCH: 1 PATCH, EXTENDED RELEASE TRANSDERMAL at 18:48

## 2018-11-29 RX ADMIN — ONDANSETRON 4 MILLIGRAM(S): 8 TABLET, FILM COATED ORAL at 10:00

## 2018-11-29 RX ADMIN — PANTOPRAZOLE SODIUM 40 MILLIGRAM(S): 20 TABLET, DELAYED RELEASE ORAL at 12:18

## 2018-11-29 NOTE — PROGRESS NOTE ADULT - ASSESSMENT
33F w/ h/o thyroid ca s/p total thyroidectomy 2009, GERD, stress urinary incontinence, joint pain & MO (BMI 40) who presents for robotic LSG. Patient s/p robotic assisted sleeve gastrectomy POD #2      -Pain/nausea control  -BCLD; IVF until tolerating adequate PO  -Protonix  -SCDs, OOB/A, IS  -AM labs

## 2018-11-29 NOTE — PROGRESS NOTE ADULT - SUBJECTIVE AND OBJECTIVE BOX
OVERNIGHT EVENTS: encouraged PO intake  : Pt c/o nausea , vomiting  Minimal PO intake ,  encourage to ambulate. Patient  given decadron    STATUS POST:  Robotic assisted sleeve gastrectomy     POST OPERATIVE DAY #: 2    SUBJECTIVE: Patient examined bedside with chief resident. Patient complains of nausea and incisional pain. Patient reports that she is not tolerating BCLD. Patient denies SOB, chest pain and vomiting. Patient making adequate urine.      heparin  Injectable 7500 Unit(s) SubCutaneous every 8 hours      Vital Signs Last 24 Hrs  T(C): 36.9 (2018 05:45), Max: 37.3 (2018 09:20)  T(F): 98.5 (2018 05:45), Max: 99.2 (2018 09:20)  HR: 59 (2018 05:45) (59 - 66)  BP: 116/71 (2018 05:45) (108/66 - 130/72)  BP(mean): --  RR: 18 (2018 05:45) (16 - 18)  SpO2: 94% (2018 05:45) (94% - 98%)  I&O's Detail    2018 07:01  -  2018 07:00  --------------------------------------------------------  IN:    dextrose 5% + sodium chloride 0.45%.: 1900 mL    lactated ringers.: 200 mL  Total IN: 2100 mL    OUT:    Voided: 2500 mL  Total OUT: 2500 mL    Total NET: -400 mL          General: NAD, resting comfortably in bed  C/V: NSR  Pulm: Nonlabored breathing, no respiratory distress  Abd: Soft, non- distended, TTP around incision site, incision clean dry and intact   Extrem: WWP, no edema, SCDs in place      LABS:                        12.2   13.1  )-----------( 281      ( 2018 06:00 )             36.1         133<L>  |  99  |  10  ----------------------------<  142<H>  3.8   |  21<L>  |  0.53    Ca    9.0      2018 05:58  Phos  3.0     11-28  Mg     1.9     11-28        Urinalysis Basic - ( 2018 11:10 )    Color: Yellow / Appearance: SL Cloudy / S.020 / pH: x  Gluc: x / Ketone: NEGATIVE  / Bili: Negative / Urobili: 0.2 E.U./dL   Blood: x / Protein: NEGATIVE mg/dL / Nitrite: NEGATIVE   Leuk Esterase: Moderate / RBC: < 5 /HPF / WBC > 10 /HPF   Sq Epi: x / Non Sq Epi: Moderate /HPF / Bacteria: Present /HPF        RADIOLOGY & ADDITIONAL STUDIES:

## 2018-11-29 NOTE — PROVIDER CONTACT NOTE (OTHER) - ACTION/TREATMENT ORDERED:
Provider notified. Pt placed on 2L NC. Incentive spirometer encouraged. Team came will come to see pt. Will continue to monitor.

## 2018-11-30 ENCOUNTER — OTHER (OUTPATIENT)
Age: 33
End: 2018-11-30

## 2018-11-30 VITALS
OXYGEN SATURATION: 98 % | TEMPERATURE: 98 F | SYSTOLIC BLOOD PRESSURE: 113 MMHG | HEART RATE: 64 BPM | RESPIRATION RATE: 14 BRPM | DIASTOLIC BLOOD PRESSURE: 75 MMHG

## 2018-11-30 LAB
ANION GAP SERPL CALC-SCNC: 9 MMOL/L — SIGNIFICANT CHANGE UP (ref 5–17)
BUN SERPL-MCNC: 8 MG/DL — SIGNIFICANT CHANGE UP (ref 7–23)
CALCIUM SERPL-MCNC: 8.6 MG/DL — SIGNIFICANT CHANGE UP (ref 8.4–10.5)
CHLORIDE SERPL-SCNC: 104 MMOL/L — SIGNIFICANT CHANGE UP (ref 96–108)
CO2 SERPL-SCNC: 25 MMOL/L — SIGNIFICANT CHANGE UP (ref 22–31)
CREAT SERPL-MCNC: 0.58 MG/DL — SIGNIFICANT CHANGE UP (ref 0.5–1.3)
GLUCOSE SERPL-MCNC: 125 MG/DL — HIGH (ref 70–99)
HCT VFR BLD CALC: 34.8 % — SIGNIFICANT CHANGE UP (ref 34.5–45)
HGB BLD-MCNC: 11.2 G/DL — LOW (ref 11.5–15.5)
MAGNESIUM SERPL-MCNC: 2.2 MG/DL — SIGNIFICANT CHANGE UP (ref 1.6–2.6)
MCHC RBC-ENTMCNC: 28.8 PG — SIGNIFICANT CHANGE UP (ref 27–34)
MCHC RBC-ENTMCNC: 32.2 G/DL — SIGNIFICANT CHANGE UP (ref 32–36)
MCV RBC AUTO: 89.5 FL — SIGNIFICANT CHANGE UP (ref 80–100)
PHOSPHATE SERPL-MCNC: 2.4 MG/DL — LOW (ref 2.5–4.5)
PLATELET # BLD AUTO: 244 K/UL — SIGNIFICANT CHANGE UP (ref 150–400)
POTASSIUM SERPL-MCNC: 3.6 MMOL/L — SIGNIFICANT CHANGE UP (ref 3.5–5.3)
POTASSIUM SERPL-SCNC: 3.6 MMOL/L — SIGNIFICANT CHANGE UP (ref 3.5–5.3)
RBC # BLD: 3.89 M/UL — SIGNIFICANT CHANGE UP (ref 3.8–5.2)
RBC # FLD: 13.7 % — SIGNIFICANT CHANGE UP (ref 10.3–16.9)
SODIUM SERPL-SCNC: 138 MMOL/L — SIGNIFICANT CHANGE UP (ref 135–145)
SURGICAL PATHOLOGY STUDY: SIGNIFICANT CHANGE UP
WBC # BLD: 11.2 K/UL — HIGH (ref 3.8–10.5)
WBC # FLD AUTO: 11.2 K/UL — HIGH (ref 3.8–10.5)

## 2018-11-30 PROCEDURE — 83735 ASSAY OF MAGNESIUM: CPT

## 2018-11-30 PROCEDURE — 88307 TISSUE EXAM BY PATHOLOGIST: CPT

## 2018-11-30 PROCEDURE — 36415 COLL VENOUS BLD VENIPUNCTURE: CPT

## 2018-11-30 PROCEDURE — 86900 BLOOD TYPING SEROLOGIC ABO: CPT

## 2018-11-30 PROCEDURE — 85027 COMPLETE CBC AUTOMATED: CPT

## 2018-11-30 PROCEDURE — 86850 RBC ANTIBODY SCREEN: CPT

## 2018-11-30 PROCEDURE — 80048 BASIC METABOLIC PNL TOTAL CA: CPT

## 2018-11-30 PROCEDURE — S2900: CPT

## 2018-11-30 PROCEDURE — 84100 ASSAY OF PHOSPHORUS: CPT

## 2018-11-30 PROCEDURE — 88341 IMHCHEM/IMCYTCHM EA ADD ANTB: CPT

## 2018-11-30 PROCEDURE — 86901 BLOOD TYPING SEROLOGIC RH(D): CPT

## 2018-11-30 PROCEDURE — 81001 URINALYSIS AUTO W/SCOPE: CPT

## 2018-11-30 RX ORDER — OMEPRAZOLE 10 MG/1
1 CAPSULE, DELAYED RELEASE ORAL
Qty: 30 | Refills: 0
Start: 2018-11-30 | End: 2018-12-29

## 2018-11-30 RX ORDER — POTASSIUM CHLORIDE 20 MEQ
40 PACKET (EA) ORAL ONCE
Qty: 0 | Refills: 0 | Status: COMPLETED | OUTPATIENT
Start: 2018-11-30 | End: 2018-11-30

## 2018-11-30 RX ORDER — PANTOPRAZOLE SODIUM 20 MG/1
40 TABLET, DELAYED RELEASE ORAL DAILY
Qty: 0 | Refills: 0 | Status: DISCONTINUED | OUTPATIENT
Start: 2018-11-30 | End: 2018-11-30

## 2018-11-30 RX ADMIN — PANTOPRAZOLE SODIUM 40 MILLIGRAM(S): 20 TABLET, DELAYED RELEASE ORAL at 11:35

## 2018-11-30 RX ADMIN — SCOPALAMINE 1 PATCH: 1 PATCH, EXTENDED RELEASE TRANSDERMAL at 11:29

## 2018-11-30 RX ADMIN — Medication 40 MILLIEQUIVALENT(S): at 11:35

## 2018-11-30 RX ADMIN — SCOPALAMINE 1 PATCH: 1 PATCH, EXTENDED RELEASE TRANSDERMAL at 06:33

## 2018-11-30 RX ADMIN — SODIUM CHLORIDE 100 MILLILITER(S): 9 INJECTION, SOLUTION INTRAVENOUS at 04:39

## 2018-11-30 RX ADMIN — HEPARIN SODIUM 7500 UNIT(S): 5000 INJECTION INTRAVENOUS; SUBCUTANEOUS at 06:30

## 2018-11-30 RX ADMIN — Medication 137 MICROGRAM(S): at 06:31

## 2018-11-30 NOTE — PROGRESS NOTE ADULT - SUBJECTIVE AND OBJECTIVE BOX
STATUS POST:  Robot-assisted laparoscopic sleeve gastrectomy      POST OPERATIVE DAY #: 3    SUBJECTIVE:   No acute events overnight.   Patient feels better, pain controlled, out of bed ambulating.  Tolerating 8oz/hr BCLD. Denies nausea/vomiting  Encouraged PO intake, out of bed ambulating, incentive spirometry    ---------------------------------------------------------------    Vital Signs Last 24 Hrs  T(C): 37.1 (30 Nov 2018 04:59), Max: 37.1 (30 Nov 2018 04:59)  T(F): 98.7 (30 Nov 2018 04:59), Max: 98.7 (30 Nov 2018 04:59)  HR: 54 (30 Nov 2018 04:59) (54 - 70)  BP: 112/70 (30 Nov 2018 04:59) (110/73 - 117/74)  BP(mean): --  RR: 18 (30 Nov 2018 04:59) (17 - 18)  SpO2: 95% (30 Nov 2018 04:59) (95% - 98%)    I&O's Summary    29 Nov 2018 07:01  -  30 Nov 2018 07:00  --------------------------------------------------------  IN: 2340 mL / OUT: 2100 mL / NET: 240 mL        ----------------------------------------------------------------    Physical Exam:  General: NAD, Comfortable in bed     Neuro: A&Ox3  Respiratory: nonlabored breathing, no respiratory distress    Abd: soft, nontender, nondistended,  incisions clean dry intact, without erythema, drainage, or malodor   Extremities: WWP, nonedematous, SCDs in place    -------------------------------------------------------------------    LABS:                        11.6   15.8  )-----------( 292      ( 29 Nov 2018 10:55 )             35.1     11-29    138  |  102  |  10  ----------------------------<  151<H>  3.7   |  26  |  0.61    Ca    8.8      29 Nov 2018 10:55  Phos  1.6     11-29  Mg     2.2     11-29              RADIOLOGY & ADDITIONAL STUDIES:

## 2018-11-30 NOTE — PROGRESS NOTE ADULT - ASSESSMENT
33F w/ h/o thyroid ca s/p total thyroidectomy 2009, GERD, stress urinary incontinence, joint pain & MO (BMI 40) who presents for robotic LSG. Patient s/p robotic assisted sleeve gastrectomy     -Pain/nausea control  -BCLD; IVF until tolerating adequate PO  -Protonix  -SCDs, OOB/A, IS  -AM labs

## 2018-12-03 DIAGNOSIS — E89.0 POSTPROCEDURAL HYPOTHYROIDISM: ICD-10-CM

## 2018-12-03 DIAGNOSIS — K21.9 GASTRO-ESOPHAGEAL REFLUX DISEASE WITHOUT ESOPHAGITIS: ICD-10-CM

## 2018-12-03 DIAGNOSIS — E66.01 MORBID (SEVERE) OBESITY DUE TO EXCESS CALORIES: ICD-10-CM

## 2018-12-03 DIAGNOSIS — N39.3 STRESS INCONTINENCE (FEMALE) (MALE): ICD-10-CM

## 2018-12-03 DIAGNOSIS — Z98.890 OTHER SPECIFIED POSTPROCEDURAL STATES: ICD-10-CM

## 2018-12-03 DIAGNOSIS — Z85.850 PERSONAL HISTORY OF MALIGNANT NEOPLASM OF THYROID: ICD-10-CM

## 2018-12-06 PROBLEM — E03.9 HYPOTHYROIDISM, UNSPECIFIED: Chronic | Status: ACTIVE | Noted: 2018-11-26

## 2018-12-06 PROBLEM — O03.9 COMPLETE OR UNSPECIFIED SPONTANEOUS ABORTION WITHOUT COMPLICATION: Chronic | Status: ACTIVE | Noted: 2018-11-26

## 2018-12-06 PROBLEM — C73 MALIGNANT NEOPLASM OF THYROID GLAND: Chronic | Status: ACTIVE | Noted: 2018-11-26

## 2018-12-10 ENCOUNTER — APPOINTMENT (OUTPATIENT)
Dept: BARIATRICS | Facility: CLINIC | Age: 33
End: 2018-12-10

## 2018-12-11 ENCOUNTER — APPOINTMENT (OUTPATIENT)
Dept: BARIATRICS | Facility: CLINIC | Age: 33
End: 2018-12-11
Payer: COMMERCIAL

## 2018-12-11 VITALS
DIASTOLIC BLOOD PRESSURE: 66 MMHG | HEIGHT: 64 IN | OXYGEN SATURATION: 99 % | SYSTOLIC BLOOD PRESSURE: 101 MMHG | HEART RATE: 82 BPM | WEIGHT: 221 LBS | TEMPERATURE: 97.9 F | BODY MASS INDEX: 37.73 KG/M2

## 2018-12-11 PROCEDURE — 99024 POSTOP FOLLOW-UP VISIT: CPT

## 2018-12-12 ENCOUNTER — APPOINTMENT (OUTPATIENT)
Dept: BARIATRICS | Facility: CLINIC | Age: 33
End: 2018-12-12

## 2019-01-07 ENCOUNTER — APPOINTMENT (OUTPATIENT)
Dept: BARIATRICS | Facility: CLINIC | Age: 34
End: 2019-01-07
Payer: COMMERCIAL

## 2019-01-07 VITALS
OXYGEN SATURATION: 100 % | TEMPERATURE: 97.8 F | DIASTOLIC BLOOD PRESSURE: 68 MMHG | HEART RATE: 74 BPM | BODY MASS INDEX: 35.26 KG/M2 | WEIGHT: 206.5 LBS | HEIGHT: 64 IN | SYSTOLIC BLOOD PRESSURE: 107 MMHG

## 2019-01-07 PROCEDURE — 97803 MED NUTRITION INDIV SUBSEQ: CPT

## 2019-01-07 PROCEDURE — 99024 POSTOP FOLLOW-UP VISIT: CPT

## 2019-01-07 NOTE — PHYSICAL EXAM
[Obese, well nourished, in no acute distress] : obese, well nourished, in no acute distress [Normal] : affect appropriate [de-identified] : neg ana bangura/l  [de-identified] : inc c/d/i

## 2019-01-07 NOTE — HISTORY OF PRESENT ILLNESS
[de-identified] : pt is about 6 wks postop , she is on stage 5 diet, movign bowels normally, no complaints.  Denies fever, chills, n/v, cp, sob, abd pain, heartburn, diarrhea, constipation, lightheadedness, dizziness, calf pain, HA

## 2019-02-21 ENCOUNTER — APPOINTMENT (OUTPATIENT)
Dept: BARIATRICS | Facility: CLINIC | Age: 34
End: 2019-02-21
Payer: COMMERCIAL

## 2019-02-21 VITALS
HEART RATE: 79 BPM | OXYGEN SATURATION: 98 % | BODY MASS INDEX: 32.44 KG/M2 | WEIGHT: 190 LBS | HEIGHT: 64 IN | DIASTOLIC BLOOD PRESSURE: 66 MMHG | SYSTOLIC BLOOD PRESSURE: 102 MMHG

## 2019-02-21 DIAGNOSIS — E66.01 MORBID (SEVERE) OBESITY DUE TO EXCESS CALORIES: ICD-10-CM

## 2019-02-21 DIAGNOSIS — E46 UNSPECIFIED PROTEIN-CALORIE MALNUTRITION: ICD-10-CM

## 2019-02-21 PROCEDURE — 99024 POSTOP FOLLOW-UP VISIT: CPT

## 2019-02-21 NOTE — REVIEW OF SYSTEMS
[Fever] : no fever [Chills] : no chills [Recent Change In Weight] : ~T recent weight change [Negative] : Psychiatric

## 2019-02-21 NOTE — HISTORY OF PRESENT ILLNESS
[de-identified] : 33 yo female presents about 3 mo s/p lap vsg.  down about 43 lbs.  goal weight is 134.  she is eating three meals a day, protein and fiber.  about 3-4 oz.  she is exercising about 2 x a week.  taking mv daily. Denies fever, chills, n/v, cp, sob, abd pain, heartburn, diarrhea, constipation, lightheadedness, dizziness, calf pain, HA .  having normal soft bm daily

## 2019-02-21 NOTE — ASSESSMENT
[FreeTextEntry1] : pt is about 3 mo postop, tolerating po, discussed increasing exercise 2 days a week if she wants to get to her goal , discussed different strength training, otherwise she is eating a well balanced diet

## 2019-05-27 ENCOUNTER — TRANSCRIPTION ENCOUNTER (OUTPATIENT)
Age: 34
End: 2019-05-27

## 2019-06-05 ENCOUNTER — APPOINTMENT (OUTPATIENT)
Dept: BARIATRICS | Facility: CLINIC | Age: 34
End: 2019-06-05
Payer: COMMERCIAL

## 2019-06-05 VITALS
WEIGHT: 157.13 LBS | BODY MASS INDEX: 26.82 KG/M2 | HEART RATE: 79 BPM | HEIGHT: 64 IN | OXYGEN SATURATION: 98 % | DIASTOLIC BLOOD PRESSURE: 63 MMHG | SYSTOLIC BLOOD PRESSURE: 104 MMHG

## 2019-06-05 PROCEDURE — 99213 OFFICE O/P EST LOW 20 MIN: CPT

## 2019-07-01 NOTE — HISTORY OF PRESENT ILLNESS
[de-identified] : Patient is doing well and has no complaints. She is eating a proper diet, taking her vitamins and exercising.

## 2019-07-01 NOTE — PHYSICAL EXAM
[Normal] : normoactive bowel sounds, soft and nontender, no hepatosplenomegaly or masses appreciated. Incisions healing appropriately without erythema or drainage

## 2019-09-11 ENCOUNTER — APPOINTMENT (OUTPATIENT)
Dept: BARIATRICS | Facility: CLINIC | Age: 34
End: 2019-09-11
Payer: COMMERCIAL

## 2019-09-11 VITALS
SYSTOLIC BLOOD PRESSURE: 106 MMHG | WEIGHT: 146.38 LBS | HEIGHT: 64 IN | DIASTOLIC BLOOD PRESSURE: 66 MMHG | HEART RATE: 79 BPM | OXYGEN SATURATION: 99 % | BODY MASS INDEX: 24.99 KG/M2 | TEMPERATURE: 98.6 F

## 2019-09-11 DIAGNOSIS — Z09 ENCOUNTER FOR FOLLOW-UP EXAMINATION AFTER COMPLETED TREATMENT FOR CONDITIONS OTHER THAN MALIGNANT NEOPLASM: ICD-10-CM

## 2019-09-11 DIAGNOSIS — K91.2 POSTSURGICAL MALABSORPTION, NOT ELSEWHERE CLASSIFIED: ICD-10-CM

## 2019-09-11 PROCEDURE — 99213 OFFICE O/P EST LOW 20 MIN: CPT

## 2019-12-11 ENCOUNTER — APPOINTMENT (OUTPATIENT)
Dept: BARIATRICS | Facility: CLINIC | Age: 34
End: 2019-12-11
Payer: COMMERCIAL

## 2019-12-11 VITALS
HEART RATE: 58 BPM | SYSTOLIC BLOOD PRESSURE: 104 MMHG | BODY MASS INDEX: 23.64 KG/M2 | OXYGEN SATURATION: 100 % | DIASTOLIC BLOOD PRESSURE: 58 MMHG | WEIGHT: 138.5 LBS | HEIGHT: 64 IN

## 2019-12-11 PROCEDURE — 99213 OFFICE O/P EST LOW 20 MIN: CPT

## 2020-01-07 NOTE — HISTORY OF PRESENT ILLNESS
[de-identified] : Patient is doing well and has no complaints. She is eating a proper diet, taking her vitamins and exercising regularly.

## 2020-06-11 ENCOUNTER — APPOINTMENT (OUTPATIENT)
Dept: BARIATRICS | Facility: CLINIC | Age: 35
End: 2020-06-11
Payer: COMMERCIAL

## 2020-06-11 DIAGNOSIS — Z98.84 BARIATRIC SURGERY STATUS: ICD-10-CM

## 2020-06-11 PROCEDURE — 99213 OFFICE O/P EST LOW 20 MIN: CPT

## 2020-07-26 PROBLEM — Z98.84 S/P LAPAROSCOPIC SLEEVE GASTRECTOMY: Status: ACTIVE | Noted: 2019-07-01

## 2020-07-26 NOTE — HISTORY OF PRESENT ILLNESS
[Home] : at home, [unfilled] , at the time of the visit. [de-identified] : Patient is doing well and has no complaints. She is eating a proper diet, taking her vitamins and exercising. Her weight is 129 pounds. She is trying to get pregnant.  [Other Location: e.g. Home (Enter Location, City,State)___] : at [unfilled] [Verbal consent obtained from patient] : the patient, [unfilled]

## 2021-04-28 NOTE — DISCHARGE NOTE ADULT - HAS THE PATIENT RECEIVED THE INFLUENZA VACCINE THIS SEASON?
yes... Complex Repair And Bilobe Flap Text: The defect edges were debeveled with a #15 scalpel blade.  The primary defect was closed partially with a complex linear closure.  Given the location of the remaining defect, shape of the defect and the proximity to free margins a bilobe flap was deemed most appropriate for complete closure of the defect.  Using a sterile surgical marker, an appropriate advancement flap was drawn incorporating the defect and placing the expected incisions within the relaxed skin tension lines where possible.    The area thus outlined was incised deep to adipose tissue with a #15 scalpel blade.  The skin margins were undermined to an appropriate distance in all directions utilizing iris scissors.

## 2021-10-20 ENCOUNTER — ASOB RESULT (OUTPATIENT)
Age: 36
End: 2021-10-20

## 2021-10-20 ENCOUNTER — APPOINTMENT (OUTPATIENT)
Dept: ANTEPARTUM | Facility: CLINIC | Age: 36
End: 2021-10-20
Payer: COMMERCIAL

## 2021-10-20 PROCEDURE — 76811 OB US DETAILED SNGL FETUS: CPT

## 2021-12-21 ENCOUNTER — APPOINTMENT (OUTPATIENT)
Dept: ANTEPARTUM | Facility: CLINIC | Age: 36
End: 2021-12-21
Payer: COMMERCIAL

## 2021-12-21 ENCOUNTER — APPOINTMENT (OUTPATIENT)
Dept: ANTEPARTUM | Facility: CLINIC | Age: 36
End: 2021-12-21

## 2021-12-21 ENCOUNTER — ASOB RESULT (OUTPATIENT)
Age: 36
End: 2021-12-21

## 2021-12-21 PROCEDURE — 76816 OB US FOLLOW-UP PER FETUS: CPT

## 2021-12-21 PROCEDURE — 99202 OFFICE O/P NEW SF 15 MIN: CPT | Mod: 25

## 2021-12-22 DIAGNOSIS — O28.3 ABNORMAL ULTRASONIC FINDING ON ANTENATAL SCREENING OF MOTHER: ICD-10-CM

## 2021-12-28 ENCOUNTER — NON-APPOINTMENT (OUTPATIENT)
Age: 36
End: 2021-12-28

## 2022-02-19 ENCOUNTER — INPATIENT (INPATIENT)
Facility: HOSPITAL | Age: 37
LOS: 1 days | Discharge: HOME | End: 2022-02-21
Attending: OBSTETRICS & GYNECOLOGY | Admitting: OBSTETRICS & GYNECOLOGY
Payer: COMMERCIAL

## 2022-02-19 VITALS — DIASTOLIC BLOOD PRESSURE: 69 MMHG | SYSTOLIC BLOOD PRESSURE: 117 MMHG | HEART RATE: 93 BPM

## 2022-02-19 DIAGNOSIS — Z98.890 OTHER SPECIFIED POSTPROCEDURAL STATES: Chronic | ICD-10-CM

## 2022-02-19 DIAGNOSIS — E89.0 POSTPROCEDURAL HYPOTHYROIDISM: Chronic | ICD-10-CM

## 2022-02-19 LAB
APPEARANCE UR: CLEAR — SIGNIFICANT CHANGE UP
BASOPHILS # BLD AUTO: 0.02 K/UL — SIGNIFICANT CHANGE UP (ref 0–0.2)
BASOPHILS NFR BLD AUTO: 0.2 % — SIGNIFICANT CHANGE UP (ref 0–1)
BILIRUB UR-MCNC: NEGATIVE — SIGNIFICANT CHANGE UP
BLD GP AB SCN SERPL QL: SIGNIFICANT CHANGE UP
COLOR SPEC: YELLOW — SIGNIFICANT CHANGE UP
DIFF PNL FLD: ABNORMAL
EOSINOPHIL # BLD AUTO: 0.08 K/UL — SIGNIFICANT CHANGE UP (ref 0–0.7)
EOSINOPHIL NFR BLD AUTO: 0.8 % — SIGNIFICANT CHANGE UP (ref 0–8)
GLUCOSE UR QL: NEGATIVE — SIGNIFICANT CHANGE UP
HCT VFR BLD CALC: 34.2 % — LOW (ref 37–47)
HGB BLD-MCNC: 10.8 G/DL — LOW (ref 12–16)
IMM GRANULOCYTES NFR BLD AUTO: 0.6 % — HIGH (ref 0.1–0.3)
KETONES UR-MCNC: NEGATIVE — SIGNIFICANT CHANGE UP
LEUKOCYTE ESTERASE UR-ACNC: NEGATIVE — SIGNIFICANT CHANGE UP
LYMPHOCYTES # BLD AUTO: 1.85 K/UL — SIGNIFICANT CHANGE UP (ref 1.2–3.4)
LYMPHOCYTES # BLD AUTO: 17.7 % — LOW (ref 20.5–51.1)
MCHC RBC-ENTMCNC: 28.5 PG — SIGNIFICANT CHANGE UP (ref 27–31)
MCHC RBC-ENTMCNC: 31.6 G/DL — LOW (ref 32–37)
MCV RBC AUTO: 90.2 FL — SIGNIFICANT CHANGE UP (ref 81–99)
MONOCYTES # BLD AUTO: 0.71 K/UL — HIGH (ref 0.1–0.6)
MONOCYTES NFR BLD AUTO: 6.8 % — SIGNIFICANT CHANGE UP (ref 1.7–9.3)
NEUTROPHILS # BLD AUTO: 7.73 K/UL — HIGH (ref 1.4–6.5)
NEUTROPHILS NFR BLD AUTO: 73.9 % — SIGNIFICANT CHANGE UP (ref 42.2–75.2)
NITRITE UR-MCNC: NEGATIVE — SIGNIFICANT CHANGE UP
NRBC # BLD: 0 /100 WBCS — SIGNIFICANT CHANGE UP (ref 0–0)
PH UR: 6.5 — SIGNIFICANT CHANGE UP (ref 5–8)
PLATELET # BLD AUTO: 250 K/UL — SIGNIFICANT CHANGE UP (ref 130–400)
PRENATAL SYPHILIS TEST: SIGNIFICANT CHANGE UP
PROT UR-MCNC: SIGNIFICANT CHANGE UP
RBC # BLD: 3.79 M/UL — LOW (ref 4.2–5.4)
RBC # FLD: 16.1 % — HIGH (ref 11.5–14.5)
SARS-COV-2 RNA SPEC QL NAA+PROBE: SIGNIFICANT CHANGE UP
SP GR SPEC: 1.02 — SIGNIFICANT CHANGE UP (ref 1.01–1.03)
UROBILINOGEN FLD QL: ABNORMAL
WBC # BLD: 10.45 K/UL — SIGNIFICANT CHANGE UP (ref 4.8–10.8)
WBC # FLD AUTO: 10.45 K/UL — SIGNIFICANT CHANGE UP (ref 4.8–10.8)

## 2022-02-19 RX ORDER — OXYTOCIN 10 UNIT/ML
333.33 VIAL (ML) INJECTION
Qty: 20 | Refills: 0 | Status: DISCONTINUED | OUTPATIENT
Start: 2022-02-19 | End: 2022-02-20

## 2022-02-19 RX ORDER — SODIUM CHLORIDE 9 MG/ML
1000 INJECTION, SOLUTION INTRAVENOUS
Refills: 0 | Status: DISCONTINUED | OUTPATIENT
Start: 2022-02-19 | End: 2022-02-20

## 2022-02-19 RX ORDER — LEVOTHYROXINE SODIUM 125 MCG
137 TABLET ORAL DAILY
Refills: 0 | Status: DISCONTINUED | OUTPATIENT
Start: 2022-02-19 | End: 2022-02-20

## 2022-02-19 NOTE — OB PROVIDER H&P - BLOOD TYPED: DATE, OB PROFILE
Barbara Delcid is an 87 year old male.    Past Medical History:   Diagnosis Date   • Aortic valve sclerosis    • BPH (benign prostatic hyperplasia)    • Cardiac pacemaker Medtronic Dual Chamber in 04/2010    • CHB (complete heart block) (CMS/HCC)    • Cholelithiasis    • Coronary atherosclerosis of native coronary artery 5/17/2012   • History of kidney stones 1990's   • History of retinal detachment 2012    Left Vitreous hemorrhage with retinal tear   • Hypercholesterolemia    • Hypertension    • Macular degeneration    • Mitral valve regurgitation    • Normal EF 59% per stress 7/2011 6/12/2012   • Rheumatoid arthritis(714.0)    • Tricuspid valve regurgitation    • Type II or unspecified type diabetes mellitus without mention of complication, not stated as uncontrolled 5/17/2012     Past Surgical History:   Procedure Laterality Date   • Colonoscopy remove lesion by snare  8-    polyps   • Echo heart resting  07/25/2011    LV EF-63%   • Echo heart resting  05/07/2013    LV EF- 71%   • Ep pacer  04/2010    Medtronic Dual Chamber    • Eye surgery  2010    Bilateral Cataract extraction with IOL implant   • Eye surgery      Right Vitrectomy   • Eye surgery      Left Vitrectomy   • Nm selena per rst/strs pharmacolo  07/26/2011    No evidence of Adenosine induced ischemia, LV EF-59%   • Removal gallbladder  1990    Cholecystectomy   • Service to gastroenterology  2003    Colonoscopy   • Xr chest pa and lateral  10/03/2011    No acute cardiopulmonary process   • Xr chest pa and lateral  05/12/2012    Stable chest   • Xr chest pa and lateral  04/18/2013    No acute cardiopulmonary disease.     Family History   Problem Relation Age of Onset   • Cancer Father      Colon Cancer   • Cancer Mother      Social History   Substance Use Topics   • Smoking status: Former Smoker     Packs/day: 0.50     Years: 40.00     Types: Cigarettes     Quit date: 1/1/1992   • Smokeless tobacco: Never Used   • Alcohol use No       Comment: quite drinking in 1992       Prior to Admission Meds:  (Not in a hospital admission)  Scheduled Meds:  • triamcinolone acetonide       • lidocaine HCl         Continuous Infusions:  PRN Meds:    Allergies: ALLERGIES:  No Known Allergies    Active Problems:    * No active hospital problems. *    Blood pressure 142/72, pulse 78, temperature 98.2 °F (36.8 °C), temperature source Oral, resp. rate 18.    Review of Systems   Constitutional: Negative.    HENT: Negative.    Eyes: Negative.    Respiratory: Negative.    Cardiovascular: Negative.    Gastrointestinal: Negative.    Genitourinary: Negative.    Musculoskeletal: Positive for back pain.   Skin: Negative.    Neurological: Positive for tingling. Negative for sensory change, speech change and focal weakness.   Endo/Heme/Allergies: Negative.  Does not bruise/bleed easily.   Psychiatric/Behavioral: Negative for depression and suicidal ideas.       Physical Exam   Constitutional: He is oriented to person, place, and time. He appears well-developed and well-nourished. No distress.   HENT:   Head: Normocephalic and atraumatic.   Eyes: Pupils are equal, round, and reactive to light. No scleral icterus.   Neck: Neck supple. No thyromegaly present.   Abdominal: Soft. He exhibits no mass. There is no tenderness. There is no guarding.   Neurological: He is alert and oriented to person, place, and time. No cranial nerve deficit. He exhibits normal muscle tone.   Skin: No rash noted. He is not diaphoretic.   Psychiatric: He has a normal mood and affect. His behavior is normal.       Assessment:  Lumbar radiculpathy    Plan:  L-MAEGAN Carrero MD  4/17/2018   02-Aug-2021

## 2022-02-19 NOTE — OB PROVIDER H&P - HISTORY OF PRESENT ILLNESS
38 yo  @39w6d, CHINTAN 22 dated by 5 day frozen embryo transfer, presents complaining of constant trickling of clear fluid since 1700. Reports intermittent mild contractions. Denies vaginal bleeding. Reports good fetal movement. H/o of total thyroidectomy for thyroid cancer, on synthroid 137mcg (112mcg prepregnancy). Sonograms showed likely fetal hypospadias and small intracranial interhemispheric cystic lesion (most likely normal variant). GBS negative.

## 2022-02-19 NOTE — OB PROVIDER H&P - NSHPLABSRESULTS_GEN_ALL_CORE
GCT 68    Sonograms:   22w3d: vertex, posterior no previa, CROW wnl, 462gm, echogenic intracardiac focus, normal male genitalia, penis appears small, follow up scan with MFM recommended to r/o hypospadias/micropenis   30w6d: vertex, posterior, MVP 5.33cm, BPP 8/8, 1529gm (19%), hypospadias suspected, fetal kidneys and bladder appear normal, small intracranial interhemispheric cystic lesion (most likely normal variant)  10/21/21 fetal echo wnl

## 2022-02-19 NOTE — OB RN PATIENT PROFILE - FALL HARM RISK - UNIVERSAL INTERVENTIONS
Bed in lowest position, wheels locked, appropriate side rails in place/Call bell, personal items and telephone in reach/Instruct patient to call for assistance before getting out of bed or chair/Non-slip footwear when patient is out of bed/[place] to call system/Physically safe environment - no spills, clutter or unnecessary equipment/Purposeful Proactive Rounding/Room/bathroom lighting operational, light cord in reach

## 2022-02-19 NOTE — OB RN PATIENT PROFILE - THE IMPORTANCE OF INITIATING BREASTFEEDING WITHIN THE FIRST HOUR OF BIRTH.
This note was copied from the mother's chart.    Basic Breastfeeding Instructions    The more you nurse the baby the more milk you will make.  Avoid bottles and pacifiers for the first 4 weeks.  Feed your baby only breastmik for the first 6 months.  Feed your baby at the earliest sign of hunger or comfort:  Sucking on fingers or hands  Bringing hands toward his mouth  Rooting or reaching for something to suck on  Sucking motions with mouth  Fretful noises  Crying is a late sign of hunger or comfort.  The baby should be positioned and latched on to the breast correctly  Chest-to-chest, chin in the breast  Babys lips are flipped outward  Babys mouth is stretched open wide like a shout  Babys sucking should feel like tugging to the mother  - The baby should be drinking at the breast  You should hear an occasional swallow during the feeding  Switch breasts when the baby takes himself off the breast or falls asleep  Keep offering breasts until the baby looks full, no longer gives hunger signs, and stays asleep when placed on his back in the crib  - If the baby is sleepy and wont wake for a feeding, put the baby skin-to-skin dressed in a diaper against the mothers bare chest  - Sleep with your baby near you in the hospital room  - Call the nurse for additional assistance as needed.           breastfeeding in the first 6 months.  Pt states understanding and verbalized appropriate recall.    Statement Selected

## 2022-02-19 NOTE — OB PROVIDER H&P - ASSESSMENT
A/P: 38 yo  @39w6d, GBS neg, mumps nonimmune, s/p total thyroidectomy, likely fetal hypospadias and small intracranial interhemispheric cystic lesion (most likely normal variant), s/p SROM at 1700   -admit to l&d  -f/u admission labs  -clear liquid diet  -cont efm/toco monitoring   -monitor vitals  -pain management prn  -plan for pitocin for augmentation  -synthroid ordered  -MMR postpartum    Dr Ni and Dr Wynn aware

## 2022-02-19 NOTE — OB PROVIDER H&P - NSICDXPASTMEDICALHX_GEN_ALL_CORE_FT
Discussed: Rest and Fluids  * Continue to treat with over-the-counter medications like pain reliever or mucinex. * Vitamin D 2000 international units daily to boost immune system, 4000 international units if sick  * Vitamin C lozenges to help immune system when sick  * Saline rinses or neti pot to clean sinus area  * Humidifier to moisturize the air. * Gargle with salt water, or listerine or apple cider vinegar and water for sore throat and drainage. *yogurt is your friend or probiotic  *considered antiviral but past 48 hours. Patient Education     Influenza (Adult)    Influenza is also called the flu. It is a viral illness that affects the air passages of your lungs. It is different from the common cold. The flu can easily be passed from one to person to another. It may be spread through the air by coughing and sneezing. Or it can be spread by touching the sick person and then touching your own eyes, nose, or mouth. The flu starts 1 to 3 days after you are exposed to the flu virus. It may lastÂ for 1 to 2 weeks but many people feel tired or fatigued for many weeks afterward. You usually donât need to take antibiotics unless you have a complication. This might be an ear or sinus infection or pneumonia. Symptoms of the flu may be mild or severe. They can include extreme tiredness (wanting to stay in bed all day), chills, fevers, muscle aches, soreness with eye movement, headache, and a dry, hacking cough. Home care  Follow these guidelines when caring for yourself at home:  Â· Avoid being around cigarette smoke, whether yours or other peopleâs. Â· Acetaminophen or ibuprofen will help ease your fever, muscle aches, and headache. Donât give aspirin to anyone younger than 25 who has the flu. Aspirin can harm the liver. Â· Nausea and loss of appetite are common with the flu. Eat light meals. Drink 6 to 8 glasses of liquids every day.  Good choices are water, sport drinks, soft drinks without caffeine, juices, tea, and soup. Extra fluids will also help loosen secretions in your nose and lungs. Â· Over-the-counter cold medicines will not make the flu go away faster. But the medicines may help with coughing, sore throat, and congestion in your nose and sinuses. Donât use a decongestant if you have high blood pressure. Â· Stay home until your fever has been gone for at least 24 hours without using medicine to reduce fever. Follow-up care  Follow up with your healthcare provider, or as advised, if you are not getting better over the next week. If you are age 72 or older, talk with your provider about getting a pneumococcal vaccine every 5 years. You should also get this vaccine if you have chronic asthma or COPD. All adults should get a flu vaccine every fall. Ask your provider about this. When to seek medical advice  Call your healthcare provider right away if any of these occur:  Â· Cough with lots of colored mucus (sputum) or blood in your mucus  Â· Chest pain, shortness of breath, wheezing, or trouble breathing  Â· Severe headache, or face, neck, or ear pain  Â· New rashÂ with fever  Â· Fever of 100.4Â°F (38Â°C)Â or higher, or asÂ directed by your healthcare provider  Â· Confusion, behavior change, or seizure  Â· Severe weakness or dizziness  Â· You get a newÂ fever or cough after getting better for a few days  Date Last Reviewed: 1/1/2017  Â© 6138-0625 [employer] [address]. [address] All rights reserved. This information is not intended as a substitute for professional medical care. Always follow your healthcare professional's instructions. PAST MEDICAL HISTORY:  Hypothyroid     Miscarriage     Thyroid cancer

## 2022-02-19 NOTE — OB PROVIDER H&P - NSHPPHYSICALEXAM_GEN_ALL_CORE
Physical exam:    Vital Signs Last 24 Hrs  T(F): 98.4 (19 Feb 2022 21:28), Max: 98.4 (19 Feb 2022 21:03)  HR: 80 (19 Feb 2022 21:46) (80 - 93)  BP: 126/60 (19 Feb 2022 21:46) (117/69 - 126/60)  RR: 14 (19 Feb 2022 21:28) (14 - 20)    Gen: NAD  Abdomen: soft, gravid, nontender, with nonpalpable contractions    EFM: 140/mod/pos acc   toco: irregular  Speculum: pooling, positive nitrazine, positive ferning  SVE: 2/60/-3, vtx, ruptured

## 2022-02-19 NOTE — OB RN TRIAGE NOTE - FALL HARM RISK - UNIVERSAL INTERVENTIONS
Bed in lowest position, wheels locked, appropriate side rails in place/Call bell, personal items and telephone in reach/Instruct patient to call for assistance before getting out of bed or chair/Non-slip footwear when patient is out of bed/[place] to call system/Physically safe environment - no spills, clutter or unnecessary equipment/Purposeful Proactive Rounding/Room/bathroom lighting operational, light cord in reach

## 2022-02-19 NOTE — OB PROVIDER H&P - NS_OBGYNHISTORY_OBGYN_ALL_OB_FT
OB Hx: . FT NSVDx2, largest 8-4. SABx1, d&cx1  GYN Hx: Denies fibroids, cysts, abnormal pap smears, STIs

## 2022-02-19 NOTE — OB PROVIDER H&P - ANESTHESIA, PREVIOUS REACTION, PROFILE
Explain to Patient that she has to be moved from slot 3 to slot 1 as we will be using the sideroom for isolation patient.   Patient complain of discomfort on movement, MD page several times to prescribe prn meds,   Prn meds given By BRITTANIE Marin with very good effect,   Sidney and cranberry juice provided, Dinner ordered.  Still awaits bed to be allocated, explain to Patient and family that we have been waiting for bed allocation since 1pm.   Patient remain stable and comfortable   none

## 2022-02-19 NOTE — OB PROVIDER H&P - ATTENDING COMMENTS
Pt seen and examined she is a  36 yo  @39w6d, GBS neg, mumps nonimmune, s/p total thyroidectomy, likely fetal hypospadias and small intracranial interhemispheric cystic lesion (most likely normal variant), s/p SROM at 1700 , in early labor  -admit  -iv access  -analgesia prn  -notify peds after delivery

## 2022-02-20 LAB
BACTERIA # UR AUTO: NEGATIVE — SIGNIFICANT CHANGE UP
BASOPHILS # BLD AUTO: 0.02 K/UL — SIGNIFICANT CHANGE UP (ref 0–0.2)
BASOPHILS NFR BLD AUTO: 0.2 % — SIGNIFICANT CHANGE UP (ref 0–1)
EOSINOPHIL # BLD AUTO: 0.11 K/UL — SIGNIFICANT CHANGE UP (ref 0–0.7)
EOSINOPHIL NFR BLD AUTO: 0.9 % — SIGNIFICANT CHANGE UP (ref 0–8)
EPI CELLS # UR: 4 /HPF — SIGNIFICANT CHANGE UP (ref 0–5)
HCT VFR BLD CALC: 29.6 % — LOW (ref 37–47)
HGB BLD-MCNC: 9.4 G/DL — LOW (ref 12–16)
HYALINE CASTS # UR AUTO: 1 /LPF — SIGNIFICANT CHANGE UP (ref 0–7)
IMM GRANULOCYTES NFR BLD AUTO: 0.6 % — HIGH (ref 0.1–0.3)
LYMPHOCYTES # BLD AUTO: 16.9 % — LOW (ref 20.5–51.1)
LYMPHOCYTES # BLD AUTO: 2.04 K/UL — SIGNIFICANT CHANGE UP (ref 1.2–3.4)
MCHC RBC-ENTMCNC: 28.9 PG — SIGNIFICANT CHANGE UP (ref 27–31)
MCHC RBC-ENTMCNC: 31.8 G/DL — LOW (ref 32–37)
MCV RBC AUTO: 91.1 FL — SIGNIFICANT CHANGE UP (ref 81–99)
MONOCYTES # BLD AUTO: 0.86 K/UL — HIGH (ref 0.1–0.6)
MONOCYTES NFR BLD AUTO: 7.1 % — SIGNIFICANT CHANGE UP (ref 1.7–9.3)
NEUTROPHILS # BLD AUTO: 8.99 K/UL — HIGH (ref 1.4–6.5)
NEUTROPHILS NFR BLD AUTO: 74.3 % — SIGNIFICANT CHANGE UP (ref 42.2–75.2)
NRBC # BLD: 0 /100 WBCS — SIGNIFICANT CHANGE UP (ref 0–0)
PLATELET # BLD AUTO: 225 K/UL — SIGNIFICANT CHANGE UP (ref 130–400)
RBC # BLD: 3.25 M/UL — LOW (ref 4.2–5.4)
RBC # FLD: 17.1 % — HIGH (ref 11.5–14.5)
RBC CASTS # UR COMP ASSIST: 4 /HPF — SIGNIFICANT CHANGE UP (ref 0–4)
WBC # BLD: 12.09 K/UL — HIGH (ref 4.8–10.8)
WBC # FLD AUTO: 12.09 K/UL — HIGH (ref 4.8–10.8)
WBC UR QL: 4 /HPF — SIGNIFICANT CHANGE UP (ref 0–5)

## 2022-02-20 PROCEDURE — 59400 OBSTETRICAL CARE: CPT

## 2022-02-20 RX ORDER — BENZOCAINE 10 %
1 GEL (GRAM) MUCOUS MEMBRANE EVERY 6 HOURS
Refills: 0 | Status: DISCONTINUED | OUTPATIENT
Start: 2022-02-20 | End: 2022-02-21

## 2022-02-20 RX ORDER — IBUPROFEN 200 MG
600 TABLET ORAL EVERY 6 HOURS
Refills: 0 | Status: COMPLETED | OUTPATIENT
Start: 2022-02-20 | End: 2023-01-19

## 2022-02-20 RX ORDER — ACETAMINOPHEN 500 MG
975 TABLET ORAL
Refills: 0 | Status: DISCONTINUED | OUTPATIENT
Start: 2022-02-20 | End: 2022-02-21

## 2022-02-20 RX ORDER — LEVOTHYROXINE SODIUM 125 MCG
137 TABLET ORAL DAILY
Refills: 0 | Status: DISCONTINUED | OUTPATIENT
Start: 2022-02-20 | End: 2022-02-21

## 2022-02-20 RX ORDER — OXYCODONE HYDROCHLORIDE 5 MG/1
5 TABLET ORAL
Refills: 0 | Status: DISCONTINUED | OUTPATIENT
Start: 2022-02-20 | End: 2022-02-21

## 2022-02-20 RX ORDER — OXYCODONE HYDROCHLORIDE 5 MG/1
5 TABLET ORAL ONCE
Refills: 0 | Status: DISCONTINUED | OUTPATIENT
Start: 2022-02-20 | End: 2022-02-21

## 2022-02-20 RX ORDER — SIMETHICONE 80 MG/1
80 TABLET, CHEWABLE ORAL EVERY 4 HOURS
Refills: 0 | Status: DISCONTINUED | OUTPATIENT
Start: 2022-02-20 | End: 2022-02-21

## 2022-02-20 RX ORDER — AER TRAVELER 0.5 G/1
1 SOLUTION RECTAL; TOPICAL EVERY 4 HOURS
Refills: 0 | Status: DISCONTINUED | OUTPATIENT
Start: 2022-02-20 | End: 2022-02-21

## 2022-02-20 RX ORDER — MAGNESIUM HYDROXIDE 400 MG/1
30 TABLET, CHEWABLE ORAL
Refills: 0 | Status: DISCONTINUED | OUTPATIENT
Start: 2022-02-20 | End: 2022-02-21

## 2022-02-20 RX ORDER — LEVOTHYROXINE SODIUM 125 MCG
112 TABLET ORAL DAILY
Refills: 0 | Status: DISCONTINUED | OUTPATIENT
Start: 2022-02-20 | End: 2022-02-20

## 2022-02-20 RX ORDER — IBUPROFEN 200 MG
600 TABLET ORAL EVERY 6 HOURS
Refills: 0 | Status: DISCONTINUED | OUTPATIENT
Start: 2022-02-20 | End: 2022-02-21

## 2022-02-20 RX ORDER — DIBUCAINE 1 %
1 OINTMENT (GRAM) RECTAL EVERY 6 HOURS
Refills: 0 | Status: DISCONTINUED | OUTPATIENT
Start: 2022-02-20 | End: 2022-02-21

## 2022-02-20 RX ORDER — TETANUS TOXOID, REDUCED DIPHTHERIA TOXOID AND ACELLULAR PERTUSSIS VACCINE, ADSORBED 5; 2.5; 8; 8; 2.5 [IU]/.5ML; [IU]/.5ML; UG/.5ML; UG/.5ML; UG/.5ML
0.5 SUSPENSION INTRAMUSCULAR ONCE
Refills: 0 | Status: DISCONTINUED | OUTPATIENT
Start: 2022-02-20 | End: 2022-02-21

## 2022-02-20 RX ORDER — DIPHENHYDRAMINE HCL 50 MG
25 CAPSULE ORAL EVERY 6 HOURS
Refills: 0 | Status: DISCONTINUED | OUTPATIENT
Start: 2022-02-20 | End: 2022-02-21

## 2022-02-20 RX ORDER — LANOLIN
1 OINTMENT (GRAM) TOPICAL EVERY 6 HOURS
Refills: 0 | Status: DISCONTINUED | OUTPATIENT
Start: 2022-02-20 | End: 2022-02-21

## 2022-02-20 RX ORDER — SODIUM CHLORIDE 9 MG/ML
3 INJECTION INTRAMUSCULAR; INTRAVENOUS; SUBCUTANEOUS EVERY 8 HOURS
Refills: 0 | Status: DISCONTINUED | OUTPATIENT
Start: 2022-02-20 | End: 2022-02-21

## 2022-02-20 RX ORDER — OXYTOCIN 10 UNIT/ML
333.33 VIAL (ML) INJECTION
Qty: 20 | Refills: 0 | Status: DISCONTINUED | OUTPATIENT
Start: 2022-02-20 | End: 2022-02-21

## 2022-02-20 RX ORDER — KETOROLAC TROMETHAMINE 30 MG/ML
30 SYRINGE (ML) INJECTION ONCE
Refills: 0 | Status: DISCONTINUED | OUTPATIENT
Start: 2022-02-20 | End: 2022-02-20

## 2022-02-20 RX ORDER — HYDROCORTISONE 1 %
1 OINTMENT (GRAM) TOPICAL EVERY 6 HOURS
Refills: 0 | Status: DISCONTINUED | OUTPATIENT
Start: 2022-02-20 | End: 2022-02-21

## 2022-02-20 RX ADMIN — SODIUM CHLORIDE 3 MILLILITER(S): 9 INJECTION INTRAMUSCULAR; INTRAVENOUS; SUBCUTANEOUS at 21:05

## 2022-02-20 RX ADMIN — Medication 137 MICROGRAM(S): at 06:29

## 2022-02-20 RX ADMIN — SODIUM CHLORIDE 3 MILLILITER(S): 9 INJECTION INTRAMUSCULAR; INTRAVENOUS; SUBCUTANEOUS at 06:51

## 2022-02-20 RX ADMIN — SODIUM CHLORIDE 3 MILLILITER(S): 9 INJECTION INTRAMUSCULAR; INTRAVENOUS; SUBCUTANEOUS at 14:09

## 2022-02-20 NOTE — OB PROVIDER DELIVERY SUMMARY - NSPROVIDERDELIVERYNOTE_OBGYN_ALL_OB_FT
Patient was fully dilated and pushed. NSD live male, Apgars 9/9. Vtx delivered  OA  ,  Fetal head restituted to LOT. The anterior and posterior shoulders delivered, followed by the remaining body atraumatically. Delayed cord clamping was performed, and then clamped and cut. Cord blood & gases collected. The  was handed to mother for skin to skin. The placenta delivered spontaneously intact with 3v cord. Uterus massaged and firm with oxytocin given IV. Cervix, vagina and perineum inspected . Repair of a first degree perineal laceration in the usual fashion with  3-0 chromic.   EBL -300cc, hemostasis assured. Hypospadias noted

## 2022-02-21 ENCOUNTER — TRANSCRIPTION ENCOUNTER (OUTPATIENT)
Age: 37
End: 2022-02-21

## 2022-02-21 VITALS
TEMPERATURE: 97 F | HEART RATE: 67 BPM | DIASTOLIC BLOOD PRESSURE: 51 MMHG | SYSTOLIC BLOOD PRESSURE: 102 MMHG | RESPIRATION RATE: 18 BRPM

## 2022-02-21 LAB
AMPHET UR-MCNC: NEGATIVE — SIGNIFICANT CHANGE UP
BARBITURATES UR SCN-MCNC: NEGATIVE — SIGNIFICANT CHANGE UP
BENZODIAZ UR-MCNC: NEGATIVE — SIGNIFICANT CHANGE UP
BUPRENORPHINE SCREEN, URINE RESULT: NEGATIVE — SIGNIFICANT CHANGE UP
COCAINE METAB.OTHER UR-MCNC: NEGATIVE — SIGNIFICANT CHANGE UP
FENTANYL UR QL: NEGATIVE — SIGNIFICANT CHANGE UP
L&D DRUG SCREEN, URINE: SIGNIFICANT CHANGE UP
METHADONE UR-MCNC: NEGATIVE — SIGNIFICANT CHANGE UP
OPIATES UR-MCNC: NEGATIVE — SIGNIFICANT CHANGE UP
OXYCODONE UR-MCNC: NEGATIVE — SIGNIFICANT CHANGE UP
PCP UR-MCNC: NEGATIVE — SIGNIFICANT CHANGE UP
PROPOXYPHENE QUALITATIVE URINE RESULT: NEGATIVE — SIGNIFICANT CHANGE UP

## 2022-02-21 RX ORDER — FOLIC ACID 0.8 MG
0 TABLET ORAL
Qty: 0 | Refills: 0 | DISCHARGE

## 2022-02-21 RX ORDER — PREGABALIN 225 MG/1
1 CAPSULE ORAL
Qty: 0 | Refills: 0 | DISCHARGE

## 2022-02-21 RX ORDER — ASCORBIC ACID 60 MG
1 TABLET,CHEWABLE ORAL
Qty: 0 | Refills: 0 | DISCHARGE

## 2022-02-21 RX ORDER — LEVOTHYROXINE SODIUM 125 MCG
1 TABLET ORAL
Qty: 0 | Refills: 0 | DISCHARGE

## 2022-02-21 RX ORDER — LEVOTHYROXINE SODIUM 125 MCG
1 TABLET ORAL
Qty: 0 | Refills: 0 | DISCHARGE
Start: 2022-02-21

## 2022-02-21 RX ORDER — CHOLECALCIFEROL (VITAMIN D3) 125 MCG
1 CAPSULE ORAL
Qty: 0 | Refills: 0 | DISCHARGE

## 2022-02-21 RX ADMIN — Medication 137 MICROGRAM(S): at 06:15

## 2022-02-21 NOTE — DISCHARGE NOTE OB - PATIENT PORTAL LINK FT
You can access the FollowMyHealth Patient Portal offered by [facility] by registering at the following website: [email or web address]. By joining [facility]’s FollowMyHealth portal, you will also be able to view your health information using other applications (apps) compatible with our system.

## 2022-02-21 NOTE — DISCHARGE NOTE OB - MEDICATION SUMMARY - MEDICATIONS TO TAKE
I will START or STAY ON the medications listed below when I get home from the hospital:    levothyroxine 137 mcg (0.137 mg) oral tablet  -- 1 tab(s) by mouth once a day  -- Indication: For thyroid

## 2022-02-21 NOTE — DISCHARGE NOTE OB - CARE PROVIDER_API CALL
Yohan Wynn)  Obstetrics and Gynecology  [address] [place] [address]  Phone: [phone]  Fax: [phone]  Follow Up Time:

## 2022-02-21 NOTE — DISCHARGE NOTE OB - NS MD DC FALL RISK RISK
For information on Fall & Injury Prevention, visit: [email or web address] OR  [email or web address] OR  https://www.cdc.gov/steadi/patient.html

## 2022-02-21 NOTE — PROGRESS NOTE ADULT - SUBJECTIVE AND OBJECTIVE BOX
OB attending  PPD #1    Pt doing well, pain well controlled. No overnight events, no acute complaints.    Ambulating: Yes  Voiding: Yes  Flatus: Yes  Bowel movements: Yes   Breast or bottle feeding: Breastfeeding  Diet: Regular    PAST MEDICAL & SURGICAL HISTORY:  Thyroid cancer    Hypothyroid    Miscarriage    H/O total thyroidectomy      History of D&amp;C        Physical Exam  Vital Signs Last 24 Hrs  T(C): 36.3 (2022 23:15), Max: 36.8 (2022 08:11)  T(F): 97.3 (2022 23:15), Max: 98.2 (2022 08:11)  HR: 80 (2022 23:15) (73 - 82)  BP: 114/64 (2022 23:15) (100/54 - 114/64)  BP(mean): --  RR: 18 (2022 23:15) (18 - 18)  SpO2: --  Gen: AAOx3, NAD  Abd: Soft, nontender, nondistended, BS+  Fundus: Firm, below umbilicus  Lochia: normal  Ext: No calf tenderness, no swelling    Labs:                        9.4    12.09 )-----------( 225      ( 2022 16:00 )             29.6     Rh+    A/P: s/p , PPD #1, doing well  - continue current management  -d/c home with instructions  -f/u 4-6 wks for pp exam   -MMR at d/c

## 2022-02-25 DIAGNOSIS — Z85.850 PERSONAL HISTORY OF MALIGNANT NEOPLASM OF THYROID: ICD-10-CM

## 2022-02-25 DIAGNOSIS — O26.893 OTHER SPECIFIED PREGNANCY RELATED CONDITIONS, THIRD TRIMESTER: ICD-10-CM

## 2022-02-25 DIAGNOSIS — Z3A.40 40 WEEKS GESTATION OF PREGNANCY: ICD-10-CM

## 2022-02-25 DIAGNOSIS — Z3A.39 39 WEEKS GESTATION OF PREGNANCY: ICD-10-CM

## 2022-02-25 DIAGNOSIS — O35.0XX0 MATERNAL CARE FOR (SUSPECTED) CENTRAL NERVOUS SYSTEM MALFORMATION IN FETUS, NOT APPLICABLE OR UNSPECIFIED: ICD-10-CM

## 2022-02-25 DIAGNOSIS — O35.8XX0 MATERNAL CARE FOR OTHER (SUSPECTED) FETAL ABNORMALITY AND DAMAGE, NOT APPLICABLE OR UNSPECIFIED: ICD-10-CM

## 2022-02-25 DIAGNOSIS — E89.0 POSTPROCEDURAL HYPOTHYROIDISM: ICD-10-CM

## 2022-02-25 DIAGNOSIS — E03.9 HYPOTHYROIDISM, UNSPECIFIED: ICD-10-CM

## 2022-04-05 VITALS
SYSTOLIC BLOOD PRESSURE: 96 MMHG | DIASTOLIC BLOOD PRESSURE: 63 MMHG | WEIGHT: 158 LBS | HEIGHT: 64 IN | BODY MASS INDEX: 26.98 KG/M2

## 2022-05-19 NOTE — OB PROVIDER H&P - NSPREVIOUSLIVEBIRTHSNOWLIVING_OBGYN_ALL_OB_NU
[facility] faxed in a request for prior authorization on:    Medication: bupropion XL (Wellbutrin XL) 150 mg tablets   Dosage: 3 tablets daily (450 mg)  Quantity requested:  270 tablets   Pharmacy for prescription has been selected.    Prior authorization request has been initiated and sent for completion.     2

## 2022-07-29 RX ORDER — NORETHINDRONE ACETATE 5 MG/1
5 TABLET ORAL DAILY
Qty: 60 | Refills: 0 | Status: ACTIVE | COMMUNITY
Start: 2022-07-29 | End: 1900-01-01

## 2022-09-15 NOTE — OB RN PATIENT PROFILE - POST PARTUM DEPRESSION SCREEN OB 4
Discussed anatomy and pathophysiology of carpal tunnel. Discussed avoidance of exacerbating activities and use of braces.  Also discussed formal occupational hand therapy, potential referral for evaluation with EMG to evaluate severity.  Would consider referral pending course with treatment.    Likely some component of ulnar neuropathy as well.    Plan:  - Today's Plan of Care:  Discussed wrist bracing, supportive care for elbow (limited leaning on the elbow, wrap elbow overnight)    Rehab: [facility] [phone]  Discussed activity considerations and other supportive care including Ice/Heat, OTC and other topical medications as needed.    -We also discussed other future treatment options:  EMG if symptoms do not improve  Discussed possible US guided carpal tunnel injection  Referral to Hand Surgery    Follow Up: 1 month    If you have any further questions for your physician or physician s care team you can call [phone] and use option 3 to leave a voice message.  
no

## 2022-09-20 RX ORDER — NORETHINDRONE ACETATE 5 MG/1
5 TABLET ORAL DAILY
Qty: 60 | Refills: 0 | Status: ACTIVE | COMMUNITY
Start: 2022-09-20 | End: 1900-01-01

## 2022-11-03 ENCOUNTER — APPOINTMENT (OUTPATIENT)
Dept: OBGYN | Facility: CLINIC | Age: 37
End: 2022-11-03

## 2022-11-03 DIAGNOSIS — Z00.00 ENCOUNTER FOR GENERAL ADULT MEDICAL EXAMINATION W/OUT ABNORMAL FINDINGS: ICD-10-CM

## 2022-11-04 ENCOUNTER — NON-APPOINTMENT (OUTPATIENT)
Age: 37
End: 2022-11-04

## 2022-11-04 DIAGNOSIS — Z86.018 PERSONAL HISTORY OF OTHER BENIGN NEOPLASM: ICD-10-CM

## 2022-11-04 DIAGNOSIS — Z87.42 PERSONAL HISTORY OF OTHER DISEASES OF THE FEMALE GENITAL TRACT: ICD-10-CM

## 2022-11-04 DIAGNOSIS — Z98.890 OTHER SPECIFIED POSTPROCEDURAL STATES: ICD-10-CM

## 2022-11-04 DIAGNOSIS — Z86.19 PERSONAL HISTORY OF OTHER INFECTIOUS AND PARASITIC DISEASES: ICD-10-CM

## 2022-11-04 DIAGNOSIS — Z78.9 OTHER SPECIFIED HEALTH STATUS: ICD-10-CM

## 2023-06-12 ENCOUNTER — APPOINTMENT (OUTPATIENT)
Dept: OBGYN | Facility: CLINIC | Age: 38
End: 2023-06-12
Payer: COMMERCIAL

## 2023-06-12 VITALS
DIASTOLIC BLOOD PRESSURE: 63 MMHG | WEIGHT: 132 LBS | HEIGHT: 64 IN | SYSTOLIC BLOOD PRESSURE: 98 MMHG | BODY MASS INDEX: 22.53 KG/M2

## 2023-06-12 DIAGNOSIS — Z01.411 ENCOUNTER FOR GYNECOLOGICAL EXAMINATION (GENERAL) (ROUTINE) WITH ABNORMAL FINDINGS: ICD-10-CM

## 2023-06-12 DIAGNOSIS — N81.4 UTEROVAGINAL PROLAPSE, UNSPECIFIED: ICD-10-CM

## 2023-06-12 LAB
BILIRUB UR QL STRIP: NORMAL
GLUCOSE UR-MCNC: NORMAL
HCG UR QL: 0.2 EU/DL
HCG UR QL: NEGATIVE
HGB UR QL STRIP.AUTO: NORMAL
KETONES UR-MCNC: NORMAL
LEUKOCYTE ESTERASE UR QL STRIP: NORMAL
NITRITE UR QL STRIP: NORMAL
PH UR STRIP: 7
PROT UR STRIP-MCNC: NORMAL
SP GR UR STRIP: 1.01

## 2023-06-12 PROCEDURE — 99395 PREV VISIT EST AGE 18-39: CPT

## 2023-06-12 PROCEDURE — 81025 URINE PREGNANCY TEST: CPT

## 2023-06-12 PROCEDURE — 99212 OFFICE O/P EST SF 10 MIN: CPT | Mod: 25

## 2023-06-12 PROCEDURE — 81003 URINALYSIS AUTO W/O SCOPE: CPT | Mod: QW

## 2023-06-12 RX ORDER — TERCONAZOLE 8 MG/G
0.8 CREAM VAGINAL
Qty: 1 | Refills: 0 | Status: ACTIVE | COMMUNITY
Start: 2023-06-12 | End: 1900-01-01

## 2023-06-12 NOTE — HISTORY OF PRESENT ILLNESS
[FreeTextEntry1] : 39 y/o p3013 LMP 5/31/23 here for wwe and evaluation of lump in vaginal area for past week.  no abn bleeding, pain or discharge.\par \par \par nsd x 3, largest 8-3 lbs, no problems, last was 5-9 lbs, hypospadias\par hypothyroid, s/p thyroidectomy for ca thyroid, 2009, follows with dr. nieves at [facility]\par \par thyroidectomy, sleeve gastrectomy, right knee torn meniscus surgery\par \par non smoker

## 2023-06-12 NOTE — PLAN
[FreeTextEntry1] : 37 y/o p3013 with normal exam and first degree prolapse\par stable\par gc/ct/ucx sent from office\par bse/folate\par precautions and options reviewed\par additional time 10 min\par f/u for annual

## 2023-06-12 NOTE — PHYSICAL EXAM
[Chaperone Present] : A chaperone was present in the examining room during all aspects of the physical examination [Appropriately responsive] : appropriately responsive [Alert] : alert [No Acute Distress] : no acute distress [Soft] : soft [Non-tender] : non-tender [Non-distended] : non-distended [No HSM] : No HSM [No Lesions] : no lesions [No Mass] : no mass [Oriented x3] : oriented x3 [Examination Of The Breasts] : a normal appearance [No Masses] : no breast masses were palpable [Labia Majora] : normal [Labia Minora] : normal [Normal] : normal [Uterine Adnexae] : normal [FreeTextEntry1] : Teresa [FreeTextEntry6] : first degree prolapse

## 2023-06-14 LAB
C TRACH RRNA SPEC QL NAA+PROBE: NOT DETECTED
N GONORRHOEA RRNA SPEC QL NAA+PROBE: NOT DETECTED
SOURCE AMPLIFICATION: NORMAL

## 2024-03-04 NOTE — OB PROVIDER H&P - NSADDLPREG1_OBGYN_ALL_OB
Patients wife notified and instructed on medication changes, lab work and  appt scheduled    ADD ADDITIONAL PRIOR PREGNANCY INFORMATION...

## 2024-07-29 ENCOUNTER — APPOINTMENT (OUTPATIENT)
Dept: OBGYN | Facility: CLINIC | Age: 39
End: 2024-07-29
Payer: COMMERCIAL

## 2024-07-29 VITALS — DIASTOLIC BLOOD PRESSURE: 67 MMHG | SYSTOLIC BLOOD PRESSURE: 106 MMHG | WEIGHT: 137 LBS | BODY MASS INDEX: 23.52 KG/M2

## 2024-07-29 LAB
BILIRUB UR QL STRIP: NORMAL
GLUCOSE UR-MCNC: NORMAL
HCG UR QL: 0.2 EU/DL
HCG UR QL: POSITIVE
HGB UR QL STRIP.AUTO: NORMAL
KETONES UR-MCNC: NORMAL
LEUKOCYTE ESTERASE UR QL STRIP: NORMAL
NITRITE UR QL STRIP: NORMAL
PH UR STRIP: 5.5
PROT UR STRIP-MCNC: NORMAL
SP GR UR STRIP: 1.02

## 2024-07-29 PROCEDURE — 99395 PREV VISIT EST AGE 18-39: CPT | Mod: 25

## 2024-07-29 PROCEDURE — 99459 PELVIC EXAMINATION: CPT

## 2024-07-29 PROCEDURE — 81025 URINE PREGNANCY TEST: CPT

## 2024-07-29 PROCEDURE — 99213 OFFICE O/P EST LOW 20 MIN: CPT | Mod: 25

## 2024-07-29 PROCEDURE — 36415 COLL VENOUS BLD VENIPUNCTURE: CPT

## 2024-07-29 PROCEDURE — 81003 URINALYSIS AUTO W/O SCOPE: CPT | Mod: QW

## 2024-07-29 PROCEDURE — 76817 TRANSVAGINAL US OBSTETRIC: CPT

## 2024-07-29 RX ORDER — DOXYLAMINE SUCCINATE AND PYRIDOXINE HYDROCHLORIDE 10; 10 MG/1; MG/1
10-10 TABLET, DELAYED RELEASE ORAL
Qty: 60 | Refills: 3 | Status: ACTIVE | COMMUNITY
Start: 2024-07-29 | End: 1900-01-01

## 2024-07-29 NOTE — HISTORY OF PRESENT ILLNESS
[FreeTextEntry1] : 40 y/o p3013 LMP 5/24 here for wwe and +UPT. no complaints. CHINTAN 2/28/24; this is IVF pregnancy      nsd x 3, largest 8-3 lbs, no problems, last was 5-9 lbs, hypospadias  hypothyroid, s/p thyroidectomy for ca thyroid, 2009, follows with dr. nieves at [facility]    thyroidectomy, sleeve gastrectomy, right knee torn meniscus surgery

## 2024-07-29 NOTE — PROCEDURE
Alexandr Huizar is a 73 year old male here for  Chief Complaint   Patient presents with   • Lung Cancer     Left Upper Lobe Lung Cancer         Medication verified and med list updated.  PCP and Pharmacy verified.    Social History     Tobacco Use   Smoking Status Former Smoker   • Packs/day: 1.00   • Years: 52.00   • Pack years: 52.00   • Types: Cigarettes   • Start date:    • Quit date: 2012   • Years since quittin.0   Smokeless Tobacco Never Used     Advance Directives Filed: Yes    ECOG:   ECOG [20 0926]   ECOG Performance Status 1       Height: No.  Ht Readings from Last 1 Encounters:   20 5' 11.26\" (1.81 m)     Weight:Yes, shoes off.  Wt Readings from Last 3 Encounters:   20 85.3 kg   20 86.1 kg   10/28/20 86.4 kg       BMI: There is no height or weight on file to calculate BMI.    REVIEW OF SYSTEMS  Patient states he doesn't use his inhaler all the time as he can't afford it.  Patient reports chills since last treatment.  Patient reports a lot of bloating and gas since his last treatment.     [Cervical Pap Smear] : cervical Pap smear [Liquid Base] : liquid base [GC & Chlamydia via Pap] : GC & Chlamydia via Pap [Tolerated Well] : the patient tolerated the procedure well [No Complications] : there were no complications [Transvaginal OB Sonogram] : Transvaginal OB Sonogram [Intrauterine Pregnancy] : intrauterine pregnancy [Yolk Sac] : yolk sac present [Fetal Heart] : fetal heart present [Transvaginal OB Sonogram WNL] : Transvaginal OB Sonogram WNL [Date: ___] : Date: [unfilled] [FreeTextEntry1] : CRL c/w dating, no ff, no masses

## 2024-07-29 NOTE — PHYSICAL EXAM
[Chaperone Present] : A chaperone was present in the examining room during all aspects of the physical examination [17524] : A chaperone was present during the pelvic exam. [Appropriately responsive] : appropriately responsive [Alert] : alert [No Acute Distress] : no acute distress [No Lymphadenopathy] : no lymphadenopathy [Regular Rate Rhythm] : regular rate rhythm [No Murmurs] : no murmurs [Clear to Auscultation B/L] : clear to auscultation bilaterally [Soft] : soft [Non-tender] : non-tender [Non-distended] : non-distended [No HSM] : No HSM [No Lesions] : no lesions [No Mass] : no mass [Oriented x3] : oriented x3 [Examination Of The Breasts] : a normal appearance [No Masses] : no breast masses were palpable [Labia Majora] : normal [Labia Minora] : normal [Normal] : normal [Uterine Adnexae] : normal

## 2024-07-30 DIAGNOSIS — D64.9 ANEMIA, UNSPECIFIED: ICD-10-CM

## 2024-07-30 LAB
ABO + RH PNL BLD: NORMAL
BASOPHILS # BLD AUTO: 0.03 K/UL
BASOPHILS NFR BLD AUTO: 0.3 %
BLD GP AB SCN SERPL QL: NORMAL
C TRACH RRNA SPEC QL NAA+PROBE: NOT DETECTED
EOSINOPHIL # BLD AUTO: 0.18 K/UL
EOSINOPHIL NFR BLD AUTO: 1.8 %
HBV SURFACE AG SER QL: NONREACTIVE
HCT VFR BLD CALC: 34.9 %
HCV AB SER QL: NONREACTIVE
HCV S/CO RATIO: 0.13 S/CO
HGB BLD-MCNC: 10 G/DL
HIV1+2 AB SPEC QL IA.RAPID: NONREACTIVE
IMM GRANULOCYTES NFR BLD AUTO: 0.3 %
LYMPHOCYTES # BLD AUTO: 2.18 K/UL
LYMPHOCYTES NFR BLD AUTO: 22 %
MAN DIFF?: NORMAL
MCHC RBC-ENTMCNC: 26 PG
MCHC RBC-ENTMCNC: 28.7 GM/DL
MCV RBC AUTO: 90.9 FL
MEV IGG FLD QL IA: 58.4 AU/ML
MEV IGG+IGM SER-IMP: POSITIVE
MONOCYTES # BLD AUTO: 0.56 K/UL
MONOCYTES NFR BLD AUTO: 5.6 %
MUV AB SER-ACNC: NEGATIVE
MUV IGG SER QL IA: <5 AU/ML
N GONORRHOEA RRNA SPEC QL NAA+PROBE: NOT DETECTED
NEUTROPHILS # BLD AUTO: 6.94 K/UL
NEUTROPHILS NFR BLD AUTO: 70 %
PLATELET # BLD AUTO: 304 K/UL
RBC # BLD: 3.84 M/UL
RBC # FLD: 14.8 %
RUBV IGG FLD-ACNC: 1.25 INDEX
RUBV IGG SER-IMP: POSITIVE
SOURCE TP AMPLIFICATION: NORMAL
T PALLIDUM AB SER QL IA: NEGATIVE
T4 FREE SERPL-MCNC: 1.5 NG/DL
TSH SERPL-ACNC: 0.74 UIU/ML
VZV AB TITR SER: POSITIVE
VZV IGG SER IF-ACNC: 756.6 INDEX
WBC # FLD AUTO: 9.92 K/UL

## 2024-07-30 RX ORDER — CHLORHEXIDINE GLUCONATE 4 %
325 (65 FE) LIQUID (ML) TOPICAL
Qty: 30 | Refills: 1 | Status: ACTIVE | COMMUNITY
Start: 2024-07-30 | End: 1900-01-01

## 2024-07-31 LAB
BACTERIA UR CULT: NORMAL
LEAD BLD-MCNC: <1 UG/DL

## 2024-08-26 ENCOUNTER — APPOINTMENT (OUTPATIENT)
Dept: OBGYN | Facility: CLINIC | Age: 39
End: 2024-08-26
Payer: COMMERCIAL

## 2024-08-26 VITALS
SYSTOLIC BLOOD PRESSURE: 108 MMHG | WEIGHT: 150 LBS | BODY MASS INDEX: 25.75 KG/M2 | HEART RATE: 91 BPM | DIASTOLIC BLOOD PRESSURE: 68 MMHG

## 2024-08-26 DIAGNOSIS — Z34.90 ENCOUNTER FOR SUPERVISION OF NORMAL PREGNANCY, UNSPECIFIED, UNSPECIFIED TRIMESTER: ICD-10-CM

## 2024-08-26 LAB
BILIRUB UR QL STRIP: NORMAL
GLUCOSE UR-MCNC: NORMAL
HCG UR QL: 0.2 EU/DL
HGB UR QL STRIP.AUTO: NORMAL
KETONES UR-MCNC: NORMAL
LEUKOCYTE ESTERASE UR QL STRIP: NORMAL
NITRITE UR QL STRIP: NORMAL
PH UR STRIP: 6
PROT UR STRIP-MCNC: NORMAL
SP GR UR STRIP: 1.01

## 2024-08-26 PROCEDURE — 36415 COLL VENOUS BLD VENIPUNCTURE: CPT

## 2024-08-26 PROCEDURE — 0501F PRENATAL FLOW SHEET: CPT

## 2024-08-26 PROCEDURE — 76801 OB US < 14 WKS SINGLE FETUS: CPT

## 2024-08-26 PROCEDURE — 81003 URINALYSIS AUTO W/O SCOPE: CPT | Mod: NC,QW

## 2024-08-26 RX ORDER — ASPIRIN ENTERIC COATED TABLETS 81 MG 81 MG/1
81 TABLET, DELAYED RELEASE ORAL DAILY
Qty: 30 | Refills: 1 | Status: ACTIVE | COMMUNITY
Start: 2024-08-26 | End: 1900-01-01

## 2024-08-27 LAB
FOLATE SERPL-MCNC: 13.8 NG/ML
TSH SERPL-ACNC: 0.46 UIU/ML
VIT B12 SERPL-MCNC: 443 PG/ML

## 2024-09-03 LAB — VIT B6 SERPL-MCNC: 16.8 UG/L

## 2024-09-25 ENCOUNTER — LABORATORY RESULT (OUTPATIENT)
Age: 39
End: 2024-09-25

## 2024-09-25 ENCOUNTER — APPOINTMENT (OUTPATIENT)
Dept: OBGYN | Facility: CLINIC | Age: 39
End: 2024-09-25

## 2024-09-25 ENCOUNTER — APPOINTMENT (OUTPATIENT)
Dept: MATERNAL FETAL MEDICINE | Facility: CLINIC | Age: 39
End: 2024-09-25
Payer: COMMERCIAL

## 2024-09-25 ENCOUNTER — NON-APPOINTMENT (OUTPATIENT)
Age: 39
End: 2024-09-25

## 2024-09-25 ENCOUNTER — APPOINTMENT (OUTPATIENT)
Dept: ANTEPARTUM | Facility: CLINIC | Age: 39
End: 2024-09-25

## 2024-09-25 ENCOUNTER — ASOB RESULT (OUTPATIENT)
Age: 39
End: 2024-09-25

## 2024-09-25 VITALS
DIASTOLIC BLOOD PRESSURE: 69 MMHG | SYSTOLIC BLOOD PRESSURE: 109 MMHG | HEART RATE: 85 BPM | BODY MASS INDEX: 28.34 KG/M2 | HEIGHT: 64 IN | WEIGHT: 166 LBS

## 2024-09-25 DIAGNOSIS — Z98.84 BARIATRIC SURGERY STATUS: ICD-10-CM

## 2024-09-25 DIAGNOSIS — O02.1 MISSED ABORTION: ICD-10-CM

## 2024-09-25 DIAGNOSIS — Z34.90 ENCOUNTER FOR SUPERVISION OF NORMAL PREGNANCY, UNSPECIFIED, UNSPECIFIED TRIMESTER: ICD-10-CM

## 2024-09-25 LAB
BILIRUB UR QL STRIP: NORMAL
GLUCOSE UR-MCNC: NORMAL
HCG UR QL: 0.2 EU/DL
HGB UR QL STRIP.AUTO: NORMAL
KETONES UR-MCNC: NORMAL
LEUKOCYTE ESTERASE UR QL STRIP: NORMAL
NITRITE UR QL STRIP: NORMAL
PH UR STRIP: 6
PROT UR STRIP-MCNC: NORMAL
SP GR UR STRIP: 1.03

## 2024-09-25 PROCEDURE — 36415 COLL VENOUS BLD VENIPUNCTURE: CPT

## 2024-09-25 PROCEDURE — ZZZZZ: CPT

## 2024-09-25 PROCEDURE — 76815 OB US LIMITED FETUS(S): CPT

## 2024-09-25 PROCEDURE — 76815 OB US LIMITED FETUS(S): CPT | Mod: 77

## 2024-09-25 PROCEDURE — 81003 URINALYSIS AUTO W/O SCOPE: CPT | Mod: NC,QW

## 2024-09-25 PROCEDURE — 99213 OFFICE O/P EST LOW 20 MIN: CPT | Mod: 25

## 2024-09-26 ENCOUNTER — NON-APPOINTMENT (OUTPATIENT)
Age: 39
End: 2024-09-26

## 2024-09-26 LAB
BASOPHILS # BLD AUTO: 0.02 K/UL
BASOPHILS NFR BLD AUTO: 0.3 %
EOSINOPHIL # BLD AUTO: 0.09 K/UL
EOSINOPHIL NFR BLD AUTO: 1.2 %
HCT VFR BLD CALC: 29.2 %
HGB BLD-MCNC: 8.5 G/DL
IMM GRANULOCYTES NFR BLD AUTO: 0.3 %
LYMPHOCYTES # BLD AUTO: 1.92 K/UL
LYMPHOCYTES NFR BLD AUTO: 25.5 %
MAN DIFF?: NORMAL
MCHC RBC-ENTMCNC: 24.8 PG
MCHC RBC-ENTMCNC: 29.1 GM/DL
MCV RBC AUTO: 85.1 FL
MONOCYTES # BLD AUTO: 0.58 K/UL
MONOCYTES NFR BLD AUTO: 7.7 %
NEUTROPHILS # BLD AUTO: 4.89 K/UL
NEUTROPHILS NFR BLD AUTO: 65 %
PLATELET # BLD AUTO: 272 K/UL
RBC # BLD: 3.43 M/UL
RBC # FLD: 14.5 %
T4 FREE SERPL-MCNC: 1.4 NG/DL
TSH SERPL-ACNC: 0.66 UIU/ML
WBC # FLD AUTO: 7.52 K/UL

## 2024-09-27 LAB
CYTOMEGALOVIRUS ABS IGM: <8 AU/ML
RUBV IGM FLD-ACNC: <10 AU/ML
TOXOPLASMA GONDII ABS IGM: <3 AU/ML

## 2024-09-30 ENCOUNTER — OUTPATIENT (OUTPATIENT)
Dept: OUTPATIENT SERVICES | Facility: HOSPITAL | Age: 39
LOS: 1 days | Discharge: HOME | End: 2024-09-30
Payer: COMMERCIAL

## 2024-09-30 ENCOUNTER — NON-APPOINTMENT (OUTPATIENT)
Age: 39
End: 2024-09-30

## 2024-09-30 ENCOUNTER — TRANSCRIPTION ENCOUNTER (OUTPATIENT)
Age: 39
End: 2024-09-30

## 2024-09-30 ENCOUNTER — RESULT REVIEW (OUTPATIENT)
Age: 39
End: 2024-09-30

## 2024-09-30 VITALS
OXYGEN SATURATION: 100 % | SYSTOLIC BLOOD PRESSURE: 120 MMHG | RESPIRATION RATE: 17 BRPM | DIASTOLIC BLOOD PRESSURE: 56 MMHG | HEART RATE: 74 BPM

## 2024-09-30 VITALS
HEART RATE: 74 BPM | OXYGEN SATURATION: 100 % | WEIGHT: 164.02 LBS | SYSTOLIC BLOOD PRESSURE: 101 MMHG | DIASTOLIC BLOOD PRESSURE: 65 MMHG | TEMPERATURE: 98 F | HEIGHT: 64 IN | RESPIRATION RATE: 17 BRPM

## 2024-09-30 DIAGNOSIS — Z98.890 OTHER SPECIFIED POSTPROCEDURAL STATES: Chronic | ICD-10-CM

## 2024-09-30 DIAGNOSIS — O36.4XX1 MATERNAL CARE FOR INTRAUTERINE DEATH, FETUS 1: ICD-10-CM

## 2024-09-30 DIAGNOSIS — E89.0 POSTPROCEDURAL HYPOTHYROIDISM: Chronic | ICD-10-CM

## 2024-09-30 PROBLEM — C73 MALIGNANT NEOPLASM OF THYROID GLAND: Chronic | Status: INACTIVE | Noted: 2018-11-26 | Resolved: 2024-09-30

## 2024-09-30 LAB
BLD GP AB SCN SERPL QL: SIGNIFICANT CHANGE UP
DNA PLOIDY SPEC FC-IMP: NORMAL
HCT VFR BLD CALC: 28.7 % — LOW (ref 37–47)
HGB BLD-MCNC: 8.7 G/DL — LOW (ref 12–16)
MCHC RBC-ENTMCNC: 24.6 PG — LOW (ref 27–31)
MCHC RBC-ENTMCNC: 30.3 G/DL — LOW (ref 32–37)
MCV RBC AUTO: 81.1 FL — SIGNIFICANT CHANGE UP (ref 81–99)
NRBC # BLD: 0 /100 WBCS — SIGNIFICANT CHANGE UP (ref 0–0)
PLATELET # BLD AUTO: 272 K/UL — SIGNIFICANT CHANGE UP (ref 130–400)
PMV BLD: 11.1 FL — HIGH (ref 7.4–10.4)
PTR INTERP: NORMAL
RBC # BLD: 3.54 M/UL — LOW (ref 4.2–5.4)
RBC # FLD: 14.2 % — SIGNIFICANT CHANGE UP (ref 11.5–14.5)
WBC # BLD: 7.43 K/UL — SIGNIFICANT CHANGE UP (ref 4.8–10.8)
WBC # FLD AUTO: 7.43 K/UL — SIGNIFICANT CHANGE UP (ref 4.8–10.8)

## 2024-09-30 PROCEDURE — 88305 TISSUE EXAM BY PATHOLOGIST: CPT

## 2024-09-30 PROCEDURE — 86901 BLOOD TYPING SEROLOGIC RH(D): CPT

## 2024-09-30 PROCEDURE — 88264 CHROMOSOME ANALYSIS 20-25: CPT

## 2024-09-30 PROCEDURE — 85027 COMPLETE CBC AUTOMATED: CPT

## 2024-09-30 PROCEDURE — C9399: CPT

## 2024-09-30 PROCEDURE — 86900 BLOOD TYPING SEROLOGIC ABO: CPT

## 2024-09-30 PROCEDURE — 88305 TISSUE EXAM BY PATHOLOGIST: CPT | Mod: 26

## 2024-09-30 PROCEDURE — 88233 TISSUE CULTURE SKIN/BIOPSY: CPT

## 2024-09-30 PROCEDURE — 86850 RBC ANTIBODY SCREEN: CPT

## 2024-09-30 PROCEDURE — 36415 COLL VENOUS BLD VENIPUNCTURE: CPT

## 2024-09-30 PROCEDURE — 59812 TREATMENT OF MISCARRIAGE: CPT

## 2024-09-30 PROCEDURE — 88280 CHROMOSOME KARYOTYPE STUDY: CPT

## 2024-09-30 RX ORDER — OXYCODONE HYDROCHLORIDE 30 MG/1
5 TABLET, FILM COATED, EXTENDED RELEASE ORAL ONCE
Refills: 0 | Status: DISCONTINUED | OUTPATIENT
Start: 2024-09-30 | End: 2024-09-30

## 2024-09-30 RX ORDER — ACETAMINOPHEN 325 MG
1000 TABLET ORAL ONCE
Refills: 0 | Status: DISCONTINUED | OUTPATIENT
Start: 2024-09-30 | End: 2024-09-30

## 2024-09-30 RX ORDER — ONDANSETRON HCL/PF 4 MG/2 ML
4 VIAL (ML) INJECTION ONCE
Refills: 0 | Status: DISCONTINUED | OUTPATIENT
Start: 2024-09-30 | End: 2024-09-30

## 2024-09-30 RX ORDER — SODIUM CHLORIDE IRRIG SOLUTION 0.9 %
1000 SOLUTION, IRRIGATION IRRIGATION
Refills: 0 | Status: DISCONTINUED | OUTPATIENT
Start: 2024-09-30 | End: 2024-09-30

## 2024-09-30 RX ORDER — HYDROMORPHONE HYDROCHLORIDE 1 MG/ML
0.5 INJECTION, SOLUTION INTRAMUSCULAR; INTRAVENOUS; SUBCUTANEOUS
Refills: 0 | Status: DISCONTINUED | OUTPATIENT
Start: 2024-09-30 | End: 2024-09-30

## 2024-09-30 RX ORDER — IRON SUCROSE 20 MG/ML
200 INJECTION, SOLUTION INTRAVENOUS ONCE
Refills: 0 | Status: COMPLETED | OUTPATIENT
Start: 2024-09-30 | End: 2024-09-30

## 2024-09-30 RX ADMIN — IRON SUCROSE 100 MILLIGRAM(S): 20 INJECTION, SOLUTION INTRAVENOUS at 14:05

## 2024-09-30 NOTE — BRIEF OPERATIVE NOTE - NSICDXBRIEFPROCEDURE_GEN_ALL_CORE_FT
PROCEDURES:  Dilation and evacuation of uterus for incomplete spontaneous  30-Sep-2024 10:16:03  Kassidy Salinas   Unknown

## 2024-09-30 NOTE — H&P PST ADULT - ATTENDING COMMENTS
Patient with fetal demise, discussed risks and benefits today, consent forms signed and witnessed. Patient amenable to blood transfusion if necessary to keep her safe, CBC, T&S today. NPO, IVF, Doxycycline IV pre operatively. Desires cytogenetics and private burial. see note

## 2024-09-30 NOTE — ASU DISCHARGE PLAN (ADULT/PEDIATRIC) - ASU DC SPECIAL INSTRUCTIONSFT
PAIN MANAGEMENT:   Motrin/Ibuprofen - 800 mg every 8 hours as needed, the maximum dose of Motrin/ibuprofen is 2400mg in 24 hours  A warm shower or heating pad may also help.    WHAT TO EXPECT AT HOME  -  Do not put anything in the vagina for at least 2 weeks after surgery unless otherwise instructed by your doctor (including tampons, douching, sexual intercourse, etc).  - It is normal to have some vaginal bleeding after surgery that would require the use of a pantiliner.   - You may have some brown/black vaginal discharge for a few days after the surgery  - Follow up with Dr. Dillon in 2 weeks and Dr. Stephens in 4 weeks     WHEN TO CALL YOUR DOCTOR:  - Fever (>100.4°F or 38.0°C) or chills  - Severe nausea or persistent vomiting.  - Bright red vaginal bleeding (soaking >1 pad/hour) or foul smelling vaginal drainage.  - Severe pain not relieved with pain medication.  - Pain with urination, cloudy urine, or foul smelling urine.  - Or if you have any other problems or questions.

## 2024-09-30 NOTE — ASU PATIENT PROFILE, ADULT - FALL HARM RISK - UNIVERSAL INTERVENTIONS
Bed in lowest position, wheels locked, appropriate side rails in place/Call bell, personal items and telephone in reach/Instruct patient to call for assistance before getting out of bed or chair/Non-slip footwear when patient is out of bed/Reader to call system/Physically safe environment - no spills, clutter or unnecessary equipment/Purposeful Proactive Rounding/Room/bathroom lighting operational, light cord in reach

## 2024-09-30 NOTE — H&P PST ADULT - ASSESSMENT
40 yo  LMP  presenting with IUFD at 17w5d presenting today for D&E management.    - Risks, benefits, alternatives of procedure reviewed.   CBC:  Hg 8.5 plt  272   Rh Positive    - Diet:: NPO, IVF   - DVT ppx: SCDs, early ambulation      40 yo  LMP  presenting with IUFD at 17w5d by dates, measuring <16w  presenting today for D&E management.    - Risks, benefits, alternatives of procedure reviewed.   CBC:  Hg 8.5 plt  272   Rh Positive  - CBC, T&S sent today pre operatively   - 600mcg Misoprostol buccally pre operatively     - Diet:: NPO, IVF   - DVT ppx: SCDs, early ambulation     - Patient desires private disposition, consent forms signed   - desires genetic testing   - Post operative education and expectations reviewed

## 2024-09-30 NOTE — CHART NOTE - NSCHARTNOTEFT_GEN_A_CORE
PACU ANESTHESIA ADMISSION NOTE      Procedure: Dilation and evacuation of uterus for incomplete spontaneous       Post op diagnosis:  Fetal demise before 20 weeks with retention of dead fetus        ____  Intubated  TV:______       Rate: ______      FiO2: ______    __x__  Patent Airway    __x__  Full return of protective reflexes    __x__  Full recovery from anesthesia / back to baseline status    Vitals  HR: 95  BP: 98.47  RR: 15  O2 Sat: 100%  Temp: 97.8    Mental Status:  __x__ Awake   ___x__ Alert   _____ Drowsy   _____ Sedated    Nausea/Vomiting:  __x__ NO  ______Yes,   See Post - Op Orders          Pain Scale (0-10):  _____    Treatment: ____ None    __x__ See Post - Op/PCA Orders    Post - Operative Fluids:   ____ Oral   __x__ See Post - Op Orders    Plan: Discharge when criteria met:   __x__Home       _____Floor     _____Critical Care   Other:_________________    Comments: Patient had smooth intraoperative event, no anesthesia complication.

## 2024-09-30 NOTE — H&P PST ADULT - NSICDXPROCEDURE_GEN_ALL_CORE_FT
PROCEDURES:  Dilation and evacuation of uterus for incomplete spontaneous  30-Sep-2024 10:16:03  Kassidy Salinas

## 2024-09-30 NOTE — BRIEF OPERATIVE NOTE - NSICDXBRIEFPREOP_GEN_ALL_CORE_FT
PRE-OP DIAGNOSIS:  Fetal demise before 20 weeks with retention of dead fetus 30-Sep-2024 13:32:09  Kassidy Salinas

## 2024-09-30 NOTE — BRIEF OPERATIVE NOTE - OPERATION/FINDINGS
Normal external female genitalia, no fetal heart rate noted on sono, dilation to accommodate size 16 cannula, thin endometrial stripe on sono, uterus firm, Monsel's solution used for hemostasis.

## 2024-09-30 NOTE — H&P PST ADULT - HISTORY OF PRESENT ILLNESS
40 yo  LMP  presenting with IUFD at 17w5d. This was an IVF pregnancy    OB Hx:  x 3, largest 8-3 lbs, no problems, last was 5-9 lbs, hypospadias  ?  PMH: hypothyroid, s/p thyroidectomy for ca thyroid, , follows with dr. nieves at [facility]: Synthroid 137 MCG Oral Tablet  PSH: thyroidectomy, sleeve gastrectomy, right knee torn meniscus surgery  Allergies: NKDA 38 yo  LMP  presenting with IUFD at 17w5d. This was an IVF pregnancy    OB Hx:  x 3, largest 8-3 lbs, no problems, last was 5-9 lbs  ?  PMH: hypothyroid, s/p thyroidectomy for ca thyroid, , follows with dr. nieves at [facility]: Synthroid 137 MCG Oral Tablet  PSH: thyroidectomy, sleeve gastrectomy, right knee torn meniscus surgery  Allergies: NKDA

## 2024-09-30 NOTE — BRIEF OPERATIVE NOTE - NSICDXBRIEFPOSTOP_GEN_ALL_CORE_FT
POST-OP DIAGNOSIS:  Fetal demise before 20 weeks with retention of dead fetus 30-Sep-2024 13:32:15  Kassidy Salinas

## 2024-09-30 NOTE — ASU DISCHARGE PLAN (ADULT/PEDIATRIC) - CARE PROVIDER_API CALL
Elizabet Dillon  Obstetrics and Gynecology  [address] [place] [address]  Phone: [phone]  Fax: [phone]  Follow Up Time:     Ghassan Stephens  Obstetrics and Gynecology  [address] [place] [address]  Phone: [phone]  Fax: [phone]  Follow Up Time:   
8

## 2024-09-30 NOTE — ASU DISCHARGE PLAN (ADULT/PEDIATRIC) - FOLLOW UP APPOINTMENTS
[facility]:  [facility]

## 2024-10-02 LAB — SURGICAL PATHOLOGY STUDY: SIGNIFICANT CHANGE UP

## 2024-10-06 DIAGNOSIS — O02.1 MISSED ABORTION: ICD-10-CM

## 2024-10-06 DIAGNOSIS — E89.0 POSTPROCEDURAL HYPOTHYROIDISM: ICD-10-CM

## 2024-10-06 DIAGNOSIS — O99.282 ENDOCRINE, NUTRITIONAL AND METABOLIC DISEASES COMPLICATING PREGNANCY, SECOND TRIMESTER: ICD-10-CM

## 2024-10-06 DIAGNOSIS — Z85.850 PERSONAL HISTORY OF MALIGNANT NEOPLASM OF THYROID: ICD-10-CM

## 2024-10-06 DIAGNOSIS — Z79.890 HORMONE REPLACEMENT THERAPY: ICD-10-CM

## 2024-10-06 DIAGNOSIS — O99.842 BARIATRIC SURGERY STATUS COMPLICATING PREGNANCY, SECOND TRIMESTER: ICD-10-CM

## 2024-10-14 ENCOUNTER — APPOINTMENT (OUTPATIENT)
Dept: OBGYN | Facility: CLINIC | Age: 39
End: 2024-10-14

## 2024-10-14 ENCOUNTER — APPOINTMENT (OUTPATIENT)
Dept: ANTEPARTUM | Facility: CLINIC | Age: 39
End: 2024-10-14

## 2024-10-15 ENCOUNTER — OUTPATIENT (OUTPATIENT)
Dept: OUTPATIENT SERVICES | Facility: HOSPITAL | Age: 39
LOS: 1 days | End: 2024-10-15
Payer: COMMERCIAL

## 2024-10-15 ENCOUNTER — APPOINTMENT (OUTPATIENT)
Dept: OBGYN | Facility: CLINIC | Age: 39
End: 2024-10-15
Payer: COMMERCIAL

## 2024-10-15 VITALS
WEIGHT: 160.38 LBS | DIASTOLIC BLOOD PRESSURE: 72 MMHG | SYSTOLIC BLOOD PRESSURE: 114 MMHG | OXYGEN SATURATION: 100 % | HEART RATE: 83 BPM | BODY MASS INDEX: 27.38 KG/M2 | HEIGHT: 64 IN

## 2024-10-15 DIAGNOSIS — Z98.890 OTHER SPECIFIED POSTPROCEDURAL STATES: ICD-10-CM

## 2024-10-15 DIAGNOSIS — N89.8 OTHER SPECIFIED NONINFLAMMATORY DISORDERS OF VAGINA: ICD-10-CM

## 2024-10-15 DIAGNOSIS — E89.0 POSTPROCEDURAL HYPOTHYROIDISM: Chronic | ICD-10-CM

## 2024-10-15 PROCEDURE — 99213 OFFICE O/P EST LOW 20 MIN: CPT

## 2024-10-15 RX ORDER — FLUCONAZOLE 150 MG/1
150 TABLET ORAL
Qty: 1 | Refills: 1 | Status: ACTIVE | COMMUNITY
Start: 2024-10-15 | End: 1900-01-01

## 2024-10-16 DIAGNOSIS — N89.8 OTHER SPECIFIED NONINFLAMMATORY DISORDERS OF VAGINA: ICD-10-CM

## 2024-10-29 ENCOUNTER — APPOINTMENT (OUTPATIENT)
Dept: OBGYN | Facility: CLINIC | Age: 39
End: 2024-10-29
Payer: COMMERCIAL

## 2024-10-29 VITALS
HEART RATE: 88 BPM | WEIGHT: 164.8 LBS | HEIGHT: 64 IN | BODY MASS INDEX: 28.13 KG/M2 | DIASTOLIC BLOOD PRESSURE: 64 MMHG | SYSTOLIC BLOOD PRESSURE: 102 MMHG

## 2024-10-29 DIAGNOSIS — N89.8 OTHER SPECIFIED NONINFLAMMATORY DISORDERS OF VAGINA: ICD-10-CM

## 2024-10-29 PROCEDURE — 99213 OFFICE O/P EST LOW 20 MIN: CPT

## 2024-10-29 PROCEDURE — 99459 PELVIC EXAMINATION: CPT | Mod: NC

## 2024-10-31 RX ORDER — NITROFURANTOIN (MONOHYDRATE/MACROCRYSTALS) 25; 75 MG/1; MG/1
100 CAPSULE ORAL
Qty: 14 | Refills: 0 | Status: ACTIVE | COMMUNITY
Start: 2024-10-31 | End: 1900-01-01

## 2024-11-04 LAB — BACTERIA UR CULT: ABNORMAL

## 2024-11-14 ENCOUNTER — NON-APPOINTMENT (OUTPATIENT)
Age: 39
End: 2024-11-14

## 2024-11-14 ENCOUNTER — APPOINTMENT (OUTPATIENT)
Dept: OBGYN | Facility: CLINIC | Age: 39
End: 2024-11-14
Payer: COMMERCIAL

## 2024-11-14 VITALS
WEIGHT: 163 LBS | HEART RATE: 91 BPM | HEIGHT: 64 IN | SYSTOLIC BLOOD PRESSURE: 105 MMHG | DIASTOLIC BLOOD PRESSURE: 67 MMHG | BODY MASS INDEX: 27.83 KG/M2

## 2024-11-14 DIAGNOSIS — N39.0 URINARY TRACT INFECTION, SITE NOT SPECIFIED: ICD-10-CM

## 2024-11-14 DIAGNOSIS — N93.9 ABNORMAL UTERINE AND VAGINAL BLEEDING, UNSPECIFIED: ICD-10-CM

## 2024-11-14 LAB
BILIRUB UR QL STRIP: NORMAL
GLUCOSE UR-MCNC: NORMAL
HCG UR QL: 0.2 EU/DL
HCG UR QL: NEGATIVE
HGB UR QL STRIP.AUTO: NORMAL
KETONES UR-MCNC: NORMAL
LEUKOCYTE ESTERASE UR QL STRIP: NORMAL
NITRITE UR QL STRIP: NORMAL
PH UR STRIP: 5.5
PROT UR STRIP-MCNC: NORMAL
SP GR UR STRIP: 1.02

## 2024-11-14 PROCEDURE — 81025 URINE PREGNANCY TEST: CPT

## 2024-11-14 PROCEDURE — 76830 TRANSVAGINAL US NON-OB: CPT

## 2024-11-14 PROCEDURE — 99212 OFFICE O/P EST SF 10 MIN: CPT | Mod: 25

## 2024-11-14 PROCEDURE — 81003 URINALYSIS AUTO W/O SCOPE: CPT | Mod: QW

## 2024-11-14 PROCEDURE — 99459 PELVIC EXAMINATION: CPT | Mod: NC

## 2024-11-22 LAB — BACTERIA UR CULT: NORMAL

## 2024-11-26 ENCOUNTER — NON-APPOINTMENT (OUTPATIENT)
Age: 39
End: 2024-11-26